# Patient Record
Sex: MALE | Race: WHITE | NOT HISPANIC OR LATINO | Employment: UNEMPLOYED | URBAN - METROPOLITAN AREA
[De-identification: names, ages, dates, MRNs, and addresses within clinical notes are randomized per-mention and may not be internally consistent; named-entity substitution may affect disease eponyms.]

---

## 2017-01-05 ENCOUNTER — HOSPITAL ENCOUNTER (EMERGENCY)
Facility: MEDICAL CENTER | Age: 37
End: 2017-01-06
Attending: EMERGENCY MEDICINE
Payer: MEDICAID

## 2017-01-05 VITALS
BODY MASS INDEX: 24.34 KG/M2 | DIASTOLIC BLOOD PRESSURE: 82 MMHG | HEIGHT: 70 IN | RESPIRATION RATE: 18 BRPM | WEIGHT: 170 LBS | TEMPERATURE: 98.4 F | HEART RATE: 82 BPM | SYSTOLIC BLOOD PRESSURE: 135 MMHG

## 2017-01-05 DIAGNOSIS — F32.9 REACTIVE DEPRESSION: ICD-10-CM

## 2017-01-05 DIAGNOSIS — F10.930 ALCOHOL WITHDRAWAL, UNCOMPLICATED (HCC): ICD-10-CM

## 2017-01-05 LAB
ALBUMIN SERPL BCP-MCNC: 3.3 G/DL (ref 3.2–4.9)
ALBUMIN/GLOB SERPL: 1.7 G/DL
ALP SERPL-CCNC: 43 U/L (ref 30–99)
ALT SERPL-CCNC: 16 U/L (ref 2–50)
AMPHET UR QL SCN: POSITIVE
ANION GAP SERPL CALC-SCNC: 6 MMOL/L (ref 0–11.9)
AST SERPL-CCNC: 28 U/L (ref 12–45)
BARBITURATES UR QL SCN: NEGATIVE
BENZODIAZ UR QL SCN: NEGATIVE
BILIRUB SERPL-MCNC: 1 MG/DL (ref 0.1–1.5)
BUN SERPL-MCNC: 7 MG/DL (ref 8–22)
BZE UR QL SCN: NEGATIVE
CALCIUM SERPL-MCNC: 8 MG/DL (ref 8.5–10.5)
CANNABINOIDS UR QL SCN: NEGATIVE
CHLORIDE SERPL-SCNC: 105 MMOL/L (ref 96–112)
CO2 SERPL-SCNC: 25 MMOL/L (ref 20–33)
CREAT SERPL-MCNC: 0.59 MG/DL (ref 0.5–1.4)
GFR SERPL CREATININE-BSD FRML MDRD: >60 ML/MIN/1.73 M 2
GLOBULIN SER CALC-MCNC: 2 G/DL (ref 1.9–3.5)
GLUCOSE SERPL-MCNC: 275 MG/DL (ref 65–99)
MDMA UR QL SCN: NEGATIVE
METHADONE UR QL SCN: NEGATIVE
OPIATES UR QL SCN: POSITIVE
OXYCODONE UR QL SCN: NEGATIVE
PCP UR QL SCN: NEGATIVE
POC BREATHALIZER: 0.01 PERCENT (ref 0–0.01)
POTASSIUM SERPL-SCNC: 4.2 MMOL/L (ref 3.6–5.5)
PROPOXYPH UR QL SCN: NEGATIVE
PROT SERPL-MCNC: 5.3 G/DL (ref 6–8.2)
SODIUM SERPL-SCNC: 136 MMOL/L (ref 135–145)

## 2017-01-05 PROCEDURE — 302970 POC BREATHALIZER

## 2017-01-05 PROCEDURE — 700101 HCHG RX REV CODE 250: Performed by: EMERGENCY MEDICINE

## 2017-01-05 PROCEDURE — 96375 TX/PRO/DX INJ NEW DRUG ADDON: CPT

## 2017-01-05 PROCEDURE — 99285 EMERGENCY DEPT VISIT HI MDM: CPT

## 2017-01-05 PROCEDURE — 80053 COMPREHEN METABOLIC PANEL: CPT

## 2017-01-05 PROCEDURE — 80307 DRUG TEST PRSMV CHEM ANLYZR: CPT

## 2017-01-05 PROCEDURE — 700111 HCHG RX REV CODE 636 W/ 250 OVERRIDE (IP): Performed by: EMERGENCY MEDICINE

## 2017-01-05 PROCEDURE — 96365 THER/PROPH/DIAG IV INF INIT: CPT

## 2017-01-05 RX ORDER — LORAZEPAM 2 MG/ML
1 INJECTION INTRAMUSCULAR ONCE
Status: COMPLETED | OUTPATIENT
Start: 2017-01-05 | End: 2017-01-05

## 2017-01-05 RX ADMIN — THIAMINE HYDROCHLORIDE 1000 ML: 100 INJECTION, SOLUTION INTRAMUSCULAR; INTRAVENOUS at 21:57

## 2017-01-05 RX ADMIN — LORAZEPAM 1 MG: 2 INJECTION INTRAMUSCULAR; INTRAVENOUS at 21:58

## 2017-01-05 ASSESSMENT — ENCOUNTER SYMPTOMS
SHORTNESS OF BREATH: 0
VOMITING: 0
NERVOUS/ANXIOUS: 1
NAUSEA: 0
DEPRESSION: 1

## 2017-01-05 ASSESSMENT — PAIN SCALES - GENERAL: PAINLEVEL_OUTOF10: 0

## 2017-01-05 NOTE — ED AVS SNAPSHOT
After Visit Summary                                                                                                                Matty Hernandez   MRN: [id number]    Department:  [facility], Emergency Dept   Date of Visit:  1/5/2017            [facility], Emergency Dept    [address] [address]    Phone:  [phone]      You were seen by     1. Denis Farrar M.D.    2. Monty Ch M.D.      Your Diagnosis Was     Reactive depression     F32.9       These are the medications you received during your hospitalization from 01/05/2017 2122 to 01/06/2017 0300     Date/Time Order Dose Route Action    01/05/2017 2157 er detox iv 1000 mL (D5LR + thiamine 100 mg + folic acid 1 mg + magnesium 1 g) infusion 1,000 mL Intravenous New Bag    01/05/2017 2158 lorazepam (ATIVAN) injection 1 mg 1 mg Intravenous Given      Follow-up Information     1. Follow up with Pcp Pt States None In 2 days.    Specialty:  Family Medicine        2. Follow up with [facility], Emergency Dept.    Specialty:  Emergency Medicine    Why:  As needed, If symptoms worsen    Contact information    [address] [place] [address] [phone]      Medication Information     Review all of your home medications and newly ordered medications with your primary doctor and/or pharmacist as soon as possible. Follow medication instructions as directed by your doctor and/or pharmacist.     Please keep your complete medication list with you and share with your physician. Update the information when medications are discontinued, doses are changed, or new medications (including over-the-counter products) are added; and carry medication information at all times in the event of emergency situations.               Medication List      Notice     You have not been prescribed any medications.            Procedures and tests performed during your visit     COMP METABOLIC PANEL    ED CONSULT  TO BEHAVIORAL HEALTH    ESTIMATED GFR    PO CHALLENGE    POC BREATHALIZER    URINE DRUG SCREEN        Discharge Instructions       Depression, Adult  Depression refers to feeling sad, low, down in the dumps, blue, gloomy, or empty. In general, there are two kinds of depression:  1. Normal sadness or normal grief. This kind of depression is one that we all feel from time to time after upsetting life experiences, such as the loss of a job or the ending of a relationship. This kind of depression is considered normal, is short lived, and resolves within a few days to 2 weeks. Depression experienced after the loss of a loved one (bereavement) often lasts longer than 2 weeks but normally gets better with time.  2. Clinical depression. This kind of depression lasts longer than normal sadness or normal grief or interferes with your ability to function at home, at work, and in school. It also interferes with your personal relationships. It affects almost every aspect of your life. Clinical depression is an illness.  Symptoms of depression can also be caused by conditions other than those mentioned above, such as:  · Physical illness. Some physical illnesses, including underactive thyroid gland (hypothyroidism), severe anemia, specific types of cancer, diabetes, uncontrolled seizures, heart and lung problems, strokes, and chronic pain are commonly associated with symptoms of depression.  · Side effects of some prescription medicine. In some people, certain types of medicine can cause symptoms of depression.  · Substance abuse. Abuse of alcohol and illicit drugs can cause symptoms of depression.  SYMPTOMS  Symptoms of normal sadness and normal grief include the following:  · Feeling sad or crying for short periods of time.  · Not caring about anything (apathy).  · Difficulty sleeping or sleeping too much.  · No longer able to enjoy the things you used to enjoy.  · Desire to be by oneself all the time (social  isolation).  · Lack of energy or motivation.  · Difficulty concentrating or remembering.  · Change in appetite or weight.  · Restlessness or agitation.  Symptoms of clinical depression include the same symptoms of normal sadness or normal grief and also the following symptoms:  · Feeling sad or crying all the time.  · Feelings of guilt or worthlessness.  · Feelings of hopelessness or helplessness.  · Thoughts of suicide or the desire to harm yourself (suicidal ideation).  · Loss of touch with reality (psychotic symptoms). Seeing or hearing things that are not real (hallucinations) or having false beliefs about your life or the people around you (delusions and paranoia).  DIAGNOSIS   The diagnosis of clinical depression is usually based on how bad the symptoms are and how long they have lasted. Your health care provider will also ask you questions about your medical history and substance use to find out if physical illness, use of prescription medicine, or substance abuse is causing your depression. Your health care provider may also order blood tests.  TREATMENT   Often, normal sadness and normal grief do not require treatment. However, sometimes antidepressant medicine is given for bereavement to ease the depressive symptoms until they resolve.  The treatment for clinical depression depends on how bad the symptoms are but often includes antidepressant medicine, counseling with a mental health professional, or both. Your health care provider will help to determine what treatment is best for you.  Depression caused by physical illness usually goes away with appropriate medical treatment of the illness. If prescription medicine is causing depression, talk with your health care provider about stopping the medicine, decreasing the dose, or changing to another medicine.  Depression caused by the abuse of alcohol or illicit drugs goes away when you stop using these substances. Some adults need professional help in order  to stop drinking or using drugs.  SEEK IMMEDIATE MEDICAL CARE IF:  · You have thoughts about hurting yourself or others.  · You lose touch with reality (have psychotic symptoms).  · You are taking medicine for depression and have a serious side effect.  FOR MORE INFORMATION  · National [place] on Mental Illness: www.evita.org   · National [place] of Mental Health: www.nimh.nih.gov      This information is not intended to replace advice given to you by your health care provider. Make sure you discuss any questions you have with your health care provider.     Document Released: 12/15/2001 Document Revised: 01/08/2016 Document Reviewed: 03/18/2013  [employer] Interactive Patient Education ©2016 [employer] Inc.            Patient Information     Patient Information    Following emergency treatment: all patient requiring follow-up care must return either to a private physician or a clinic if your condition worsens before you are able to obtain further medical attention, please return to the emergency room.     Billing Information    At [facility], we work to make the billing process streamlined for our patients.  Our Representatives are here to answer any questions you may have regarding your hospital bill.  If you have insurance coverage and have supplied your insurance information to us, we will submit a claim to your insurer on your behalf.  Should you have any questions regarding your bill, we can be reached online or by phone as follows:  Online: You are able pay your bills online or live chat with our representatives about any billing questions you may have. We are here to help Monday - Friday from 8:00am to 7:30pm and 9:00am - 12:00pm on Saturdays.  Please visit [email or web address]  for more information.   Phone:  [phone] or [phone]    Please note that your emergency physician, surgeon, pathologist, radiologist, anesthesiologist, and other specialists are not employed  by [facility] and will therefore bill separately for their services.  Please contact them directly for any questions concerning their bills at the numbers below:     Emergency Physician Services:  [phone]  [facility]:  [phone]  [facility]:  [phone]  [facility]:  [phone]    1. Your final bill may vary from the amount quoted upon discharge if all procedures are not complete at that time, or if your doctor has additional procedures of which we are not aware. You will receive an additional bill if you return to the Emergency Department at [facility] for suture removal regardless of the facility of which the sutures were placed.     2. Please arrange for settlement of this account at the emergency registration.    3. All self-pay accounts are due in full at the time of treatment.  If you are unable to meet this obligation then payment is expected within 4-5 days.     4. If you have had radiology studies (CT, X-ray, Ultrasound, MRI), you have received a preliminary result during your emergency department visit. Please contact the radiology department [phone] to receive a copy of your final result. Please discuss the Final result with your primary physician or with the follow up physician provided.     Crisis Hotline:  [place] Crisis Hotline:  2-743-GPMFMEE or 1-778.651.5567  [place] Crisis Hotline:    1-443.382.9003 or 302-006-6970         ED Discharge Follow Up Questions    1. In order to provide you with very good care, we would like to follow up with a phone call in the next few days.  May we have your permission to contact you?     YES /  NO    2. What is the best phone number to call you? (       )_____-__________    3. What is the best time to call you?      Morning  /  Afternoon  /  Evening                   Patient Signature:  ____________________________________________________________    Date:   ____________________________________________________________

## 2017-01-05 NOTE — ED AVS SNAPSHOT
BigRoad Access Code: [email or web address]  Expires: 2/5/2017  2:59 AM    Your email address is not on file at [facility].  Email Addresses are required for you to sign up for BigRoad, please contact [phone] to verify your personal information and to provide your email address prior to attempting to register for BigRoad.    Matty Hernandez  [address] [place] [address]    [employer]  A secure, online tool to manage your health information     [facility]’s BigRoad® is a secure, online tool that connects you to your personalized health information from the privacy of your home -- day or night - making it very easy for you to manage your healthcare. Once the activation process is completed, you can even access your medical information using the BigRoad kylee, which is available for free in the Apple Kylee store or Google Play store.     To learn more about BigRoad, visit [email or web address]    There are two levels of access available (as shown below):   My Chart Features  [facility] Primary Care Doctor [facility]  Specialists [facility]  Urgent  Care Non-[facility] Primary Care Doctor   Email your healthcare team securely and privately 24/7 X X X    Manage appointments: schedule your next appointment; view details of past/upcoming appointments X      Request prescription refills. X      View recent personal medical records, including lab and immunizations X X X X   View health record, including health history, allergies, medications X X X X   Read reports about your outpatient visits, procedures, consult and ER notes X X X X   See your discharge summary, which is a recap of your hospital and/or ER visit that includes your diagnosis, lab results, and care plan X X  X     How to register for [employer]:  Once your e-mail address has been verified, follow the following steps to sign up for [employer].     1. Go to  [email or web address]  2. Click on the Sign Up Now box, which takes you to the New Member Sign Up page. You will need to  provide the following information:  a. Enter your Rypos Access Code exactly as it appears at the top of this page. (You will not need to use this code after you’ve completed the sign-up process. If you do not sign up before the expiration date, you must request a new code.)   b. Enter your date of birth.   c. Enter your home email address.   d. Click Submit, and follow the next screen’s instructions.  3. Create a Rypos ID. This will be your Rypos login ID and cannot be changed, so think of one that is secure and easy to remember.  4. Create a Rypos password. You can change your password at any time.  5. Enter your Password Reset Question and Answer. This can be used at a later time if you forget your password.   6. Enter your e-mail address. This allows you to receive e-mail notifications when new information is available in Rypos.  7. Click Sign Up. You can now view your health information.    For assistance activating your Rypos account, call [phone]

## 2017-01-05 NOTE — ED AVS SNAPSHOT
1/6/2017          Matty Hernandez  [address]  [place] [address]    Dear Matty:    [facility] wants to ensure your discharge home is safe and you or your loved ones have had all your questions answered regarding your care after you leave the hospital.    You may receive a telephone call within two days of your discharge.  This call is to make certain you understand your discharge instructions as well as ensure we provided you with the best care possible during your stay with us.     The call will only last approximately 3-5 minutes and will be done by a nurse.    Once again, we want to ensure your discharge home is safe and that you have a clear understanding of any next steps in your care.  If you have any questions or concerns, please do not hesitate to contact us, we are here for you.  Thank you for choosing [facility] for your healthcare needs.    Sincerely,    Bruce Sanchez    [facility]

## 2017-01-06 PROCEDURE — 90791 PSYCH DIAGNOSTIC EVALUATION: CPT

## 2017-01-06 NOTE — ED NOTES
Pt placed on legal hold. All items removed from room, pt belongings labeled and in ambulance bay.

## 2017-01-06 NOTE — CONSULTS
[facility]   TRIAGE ASSESSMENT    Name: Matty Hernandez  MRN: [id number]  : 1980  Age: 36 y.o.  Date of assessment: 2017  PCP: Pcp Pt States None  Persons in attendance: Patient    CHIEF COMPLAINT/PRESENTING ISSUE (as stated by Matty Hernandez):   Chief Complaint   Patient presents with   • Suicidal Ideation        CURRENT LIVING SITUATION/SOCIAL SUPPORT: Shelter; states family is in [place] but supportive and caring    BEHAVIORAL HEALTH TREATMENT HISTORY  Does patient/parent report a history of prior behavioral health treatment for patient?   No:    SAFETY ASSESSMENT - SELF  Does patient acknowledge current or past symptoms of dangerousness to self? no  Does parent/significant other report patient has current or past symptoms of dangerousness to self? N\A  Does presenting problem suggest symptoms of dangerousness to self? No    SAFETY ASSESSMENT - OTHERS  Does patient acknowledge current or past symptoms of aggressive behavior or risk to others? no  Does parent/significant other report patient has current or past symptoms of aggressive behavior or risk to others?  N\A  Does presenting problem suggest symptoms of dangerousness to others? No    Crisis Safety Plan completed and copy given to patient? N\A    ABUSE/NEGLECT SCREENING  Does patient report feeling “unsafe” in his/her home, or afraid of anyone?  no  Does patient report any history of physical, sexual, or emotional abuse?  no  Does parent or significant other report any of the above? N\A  Is there evidence of neglect by self?  no  Is there evidence of neglect by a caregiver? no  Does the patient/parent report any history of CPS/APS/police involvement related to suspected abuse/neglect or domestic violence? no  Based on the information provided during the current assessment, is a mandated report of suspected abuse/neglect being made?  No    SUBSTANCE USE SCREENING  Yes:  Vasile all substances used in the past 30 days:      Last Use Amount   [x]    "Alcohol yesterday Pint/daily   []   Marijuana     []   Heroin     []   Prescription Opioids  (used without prescription, for    recreation, or in excess of prescribed amount)     []   Other Prescription  (used without prescription, for    recreation, or in excess of prescribed amount)     []   Cocaine      [x]   Methamphetamine yesterday $10 daily   []   \"\" drugs (ectasy, MDMA)     []   Other substances        UDS results: patient admits to alcohol and methamphetamines  Breathalyzer results: negativeWhat consequences does the patient associate with any of the above substance use and or addictive behaviors? Family problems: 3 children are with CPS, Monetary problems: can't afford    Risk factors for detox (check all that apply):  []  Seizures   [x]  Diaphoretic (sweating)   [x]  Tremors   []  Hallucinations   []  Increased blood pressure   []  Decreased blood pressure   [x]  Other anxiety   []  None      [] Patient education on risk factors for detoxification and instructed to return to ER as needed.      UDS results:   Breathalyzer results:     Risk factors for detox (check all that apply):  [] Seizures   [] Diaphoretic (sweating)   [] Tremors   [] Hallucinations   [] Increased blood pressure   [] Decreased blood pressure   [] Other    [] Patient education on risk factors for detoxification and instructed to return to ER as needed.  MENTAL STATUS   Participation: Active verbal participation  Grooming: Casual  Orientation: Alert and Fully Oriented  Behavior: Calm  Eye contact: Good  Mood: Depressed  Affect: Congruent with content and Sad  Thought process: Logical and Goal-directed  Thought content: Within normal limits  Speech: Rate within normal limits  Perception: Within normal limits  Memory:  No gross evidence of memory deficits  Insight: Adequate  Judgment:  Adequate  Other:    Collateral information:   Source:  [] Significant other present in person:   [] Significant other by telephone  [] [facility] Social " Worker  [x] [facility] Nursing Staff  [] [facility]  [] Other:     [] Unable to complete full assessment due to:  [] Acute intoxication  [] Patient declined to participate/engage  [] Patient verbally unresponsive  [] Significant cognitive deficits  [] Significant perceptual distortions or behavioral disorganization  [] Other:      CLINICAL IMPRESSIONS:  Primary  R/O: Amphetamine indiuced depressive d/o F15.94   Secondary:  R/O Alcohol use disorder severe F10.20       IDENTIFIED NEEDS/PLAN:  [Trigger DISPOSITION list for any items marked]    []  Imminent safety risk - self [] Imminent safety risk - others   [x]  Acute substance withdrawl []  Psychosis/Impaired reality testing   []  Mood/anxiety [x]  Substance use/Addictive behavior   [x]  Maladaptive behaviro []  Parent/child conflict   []  Family/Couples conflict []  Biomedical   [x]  Housing [x]  Financial   []   Legal  Occupational/Educational   []  Domestic violence []  Other:     Disposition: Refer to [facility]    Does patient express agreement with the above plan? yes    Referral appointment(s) scheduled? N\A    Alert team only: 36 year old male initially reported that he was suicidal.  During the assessment he reported that he wanted to detox and get treatment for his alcohol and drug problem.  He had been to [facility] and wanted to be admitted again for detox.  He stated he would talk to [facility] about IOP after detox.  [facility] was contacted and they stated that they did have an available bed and to send him over.   proved a taxi voucher.  No current suicidal ideation; positive future orientation  I have discussed findings and recommendations with Dr. Ch who is in agreement with these recommendations.     Referral documentation sent to the following facilities:  [facility]    Melany Pelayo R.N.  1/6/2017

## 2017-01-06 NOTE — DISCHARGE INSTRUCTIONS
Depression, Adult  Depression refers to feeling sad, low, down in the dumps, blue, gloomy, or empty. In general, there are two kinds of depression:  1. Normal sadness or normal grief. This kind of depression is one that we all feel from time to time after upsetting life experiences, such as the loss of a job or the ending of a relationship. This kind of depression is considered normal, is short lived, and resolves within a few days to 2 weeks. Depression experienced after the loss of a loved one (bereavement) often lasts longer than 2 weeks but normally gets better with time.  2. Clinical depression. This kind of depression lasts longer than normal sadness or normal grief or interferes with your ability to function at home, at work, and in school. It also interferes with your personal relationships. It affects almost every aspect of your life. Clinical depression is an illness.  Symptoms of depression can also be caused by conditions other than those mentioned above, such as:  · Physical illness. Some physical illnesses, including underactive thyroid gland (hypothyroidism), severe anemia, specific types of cancer, diabetes, uncontrolled seizures, heart and lung problems, strokes, and chronic pain are commonly associated with symptoms of depression.  · Side effects of some prescription medicine. In some people, certain types of medicine can cause symptoms of depression.  · Substance abuse. Abuse of alcohol and illicit drugs can cause symptoms of depression.  SYMPTOMS  Symptoms of normal sadness and normal grief include the following:  · Feeling sad or crying for short periods of time.  · Not caring about anything (apathy).  · Difficulty sleeping or sleeping too much.  · No longer able to enjoy the things you used to enjoy.  · Desire to be by oneself all the time (social isolation).  · Lack of energy or motivation.  · Difficulty concentrating or remembering.  · Change in appetite or weight.  · Restlessness or  agitation.  Symptoms of clinical depression include the same symptoms of normal sadness or normal grief and also the following symptoms:  · Feeling sad or crying all the time.  · Feelings of guilt or worthlessness.  · Feelings of hopelessness or helplessness.  · Thoughts of suicide or the desire to harm yourself (suicidal ideation).  · Loss of touch with reality (psychotic symptoms). Seeing or hearing things that are not real (hallucinations) or having false beliefs about your life or the people around you (delusions and paranoia).  DIAGNOSIS   The diagnosis of clinical depression is usually based on how bad the symptoms are and how long they have lasted. Your health care provider will also ask you questions about your medical history and substance use to find out if physical illness, use of prescription medicine, or substance abuse is causing your depression. Your health care provider may also order blood tests.  TREATMENT   Often, normal sadness and normal grief do not require treatment. However, sometimes antidepressant medicine is given for bereavement to ease the depressive symptoms until they resolve.  The treatment for clinical depression depends on how bad the symptoms are but often includes antidepressant medicine, counseling with a mental health professional, or both. Your health care provider will help to determine what treatment is best for you.  Depression caused by physical illness usually goes away with appropriate medical treatment of the illness. If prescription medicine is causing depression, talk with your health care provider about stopping the medicine, decreasing the dose, or changing to another medicine.  Depression caused by the abuse of alcohol or illicit drugs goes away when you stop using these substances. Some adults need professional help in order to stop drinking or using drugs.  SEEK IMMEDIATE MEDICAL CARE IF:  · You have thoughts about hurting yourself or others.  · You lose touch  with reality (have psychotic symptoms).  · You are taking medicine for depression and have a serious side effect.  FOR MORE INFORMATION  · National [place] on Mental Illness: www.evita.org   · National [place] of Mental Health: www.nimh.nih.gov      This information is not intended to replace advice given to you by your health care provider. Make sure you discuss any questions you have with your health care provider.     Document Released: 12/15/2001 Document Revised: 01/08/2016 Document Reviewed: 03/18/2013  Elsevier Interactive Patient Education ©2016 [employer].

## 2017-01-06 NOTE — ED NOTES
Pt to ER per shante. PT reports SI.  Hx of SI x4 years ago. Pt went to [facility] for detox today. Pt states smoked meth at noon, and half 5th of whiskey earlier today.

## 2017-01-06 NOTE — ED NOTES
Pt sleeping on cart showing no signs of distress. Pt remains NSR on monitor, HR at 95. Resp even unlabored, skin pink warm dry.

## 2017-01-06 NOTE — ED NOTES
Pt remains on cart sleeping. NSR on monitor No signs of distress. Resp even unlabored, skin pink warm dry

## 2017-01-06 NOTE — ED PROVIDER NOTES
"ED Provider Note    Scribed for Denis Farrar M.D. by Christine Felton. 1/5/2017, 9:34 PM.    Primary care provider: Pcp Pt States None  Means of arrival: EMS  History obtained from: Patient  History limited by: None     CHIEF COMPLAINT  Chief Complaint   Patient presents with   • Suicidal Ideation       HAKEEM Hernandez is a 36 y.o. male who presents to the Emergency Department by ambulance for suicidal ideation. He states, \"My life is a wreck.\". Patient states his last drink was 4-5 hours ago and he drinks roughly a pint per day. He also uses methamphetamine an average of four times a week and last used today. Patient states he has been on and off drugs since his twenties and he is \"ready to quit\". He report a recent period of sobriety for three months when he was away from [place], but when he returned to [place] he began using again. He currently has no medical complaints and denies chest pain, shortness of breath, nausea, vomiting and pain. He states he currently feels anxious and depressed. He denies history of medical problems including diabetes and hypertension. He is a cigarette smoker.      REVIEW OF SYSTEMS  Review of Systems   Respiratory: Negative for shortness of breath.    Cardiovascular: Negative for chest pain.   Gastrointestinal: Negative for nausea and vomiting.   Psychiatric/Behavioral: Positive for depression and suicidal ideas. The patient is nervous/anxious.      PAST MEDICAL HISTORY   No chronic medical problems reported.     SURGICAL HISTORY  patient denies any surgical history    SOCIAL HISTORY  Social History   Substance Use Topics   • Smoking status: Current Some Day Smoker   • Alcohol Use: Yes      Comment: occ      History   Drug Use   • Yes   • Special: Intravenous, Inhaled     Comment: crystal meth, last 6/1/16     FAMILY HISTORY  None noted during this encounter.     CURRENT MEDICATIONS  Home Medications     Reviewed by Monty Montejo R.N. (Registered Nurse) on 01/05/17 at 2127  Med " "List Status: Not Addressed    Medication Last Dose Status          Patient Rubin Taking any Medications                      ALLERGIES  No Known Allergies    PHYSICAL EXAM  VITAL SIGNS: /77 mmHg  Pulse 108  Temp(Src) 36.9 °C (98.4 °F)  Resp 18  Ht 1.778 m (5' 10\")  Wt 77.111 kg (170 lb)  BMI 24.39 kg/m2    Constitutional:  No acute distress  HENT: Moist mucous membranes  Eyes: No conjunctivitis or icterus  Neck: trachea is midline, no palpable thyroid  Lymphatic: No cervical lymphadenopathy  Cardiovascular: Tachycardic. Regularrhythm, no murmurs  Thorax & Lungs: Normal breath sounds, no rhonchi  Abdomen: Soft, Non-tender  Skin:. No rash  Back: Non-tender, no CVA tenderness  Extremities:  no edema  Vascular: symmetric radial pulse  Neurologic: Normal gross motor  Psychiatric: Normal affect.    LABS  Labs Reviewed   COMP METABOLIC PANEL - Abnormal; Notable for the following:     Glucose 275 (*)     Bun 7 (*)     Calcium 8.0 (*)     Total Protein 5.3 (*)     All other components within normal limits   URINE DRUG SCREEN - Abnormal; Notable for the following:     Amphetamines Urine Positive (*)     Opiates Positive (*)     All other components within normal limits   POC BREATHALIZER - Normal   ESTIMATED GFR     All labs reviewed by me.    COURSE & MEDICAL DECISION MAKING  Pertinent Labs & Imaging studies reviewed. (See chart for details)    9:34 PM - Patient seen and examined at bedside. Patient will be treated with ER Detox IV 1000 mL with D5Lr + thiamine 100 mg + folic acid 1 mg + magnesium 1g and 1 mg ativan. Ordered urine drug screen, CMP and POC breathalyzer to evaluate his symptoms.  [facility] will see the patient.       DISPOSITION:  Patient's care will be transferred to my partner as he awaits transfer to a psychiatric facility.    FINAL IMPRESSION  1. Reactive depression    2. Alcohol withdrawal, uncomplicated (HCC)          Christine RAMSYE (Scribe), am scribing for, and in the presence of, Denis" NOBLE Farrar M.D..    Electronically signed by: Christine Felton (Scribe), 1/5/2017    I, Denis Farrar M.D. personally performed the services described in this documentation, as scribed by Christine Felton in my presence, and it is both accurate and complete.    The note accurately reflects work and decisions made by me.  Denis Farrar  1/6/2017  5:35 PM

## 2017-01-06 NOTE — ED NOTES
D/c instructions and cab voucher given to pt. [employer] called, eta 15 mins. Pt verbalizes he understands d/c instructions and denies further questions, needs, or concerns. Encouraged follow up and to return to ER if symptoms return or worsen. Pt resp even unlabored, skin pink warm dry, alert and oriented x3, ambulates with steady gait.

## 2017-03-13 ENCOUNTER — HOSPITAL ENCOUNTER (EMERGENCY)
Facility: MEDICAL CENTER | Age: 37
End: 2017-03-13
Attending: EMERGENCY MEDICINE
Payer: MEDICAID

## 2017-03-13 VITALS
TEMPERATURE: 97.5 F | WEIGHT: 175.04 LBS | RESPIRATION RATE: 15 BRPM | DIASTOLIC BLOOD PRESSURE: 106 MMHG | HEIGHT: 70 IN | HEART RATE: 99 BPM | OXYGEN SATURATION: 96 % | SYSTOLIC BLOOD PRESSURE: 149 MMHG | BODY MASS INDEX: 25.06 KG/M2

## 2017-03-13 DIAGNOSIS — F15.10 METHAMPHETAMINE ABUSE (HCC): ICD-10-CM

## 2017-03-13 DIAGNOSIS — F19.10 POLYSUBSTANCE ABUSE (HCC): ICD-10-CM

## 2017-03-13 DIAGNOSIS — F10.10 ALCOHOL ABUSE: ICD-10-CM

## 2017-03-13 PROCEDURE — 99284 EMERGENCY DEPT VISIT MOD MDM: CPT

## 2017-03-13 PROCEDURE — 90791 PSYCH DIAGNOSTIC EVALUATION: CPT

## 2017-03-13 ASSESSMENT — PAIN SCALES - GENERAL: PAINLEVEL_OUTOF10: 0
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
## 2017-03-13 NOTE — ED NOTES
Jassi from [employer] has seen patient. He notified [facility] and they will come to interview him.

## 2017-03-13 NOTE — ED PROVIDER NOTES
"ED Provider Note    Scribed for Caleb Paul M.D. by Parviz Yao. 3/13/2017  7:35 AM    Primary care provider: Pcp Pt States None  Means of arrival: walk in  History obtained from: patient  History limited by: none    CHIEF COMPLAINT  Chief Complaint   Patient presents with   • Psych Eval     paranoid   • Drug Ingestion   • Alcohol Intoxication   • Detox     was 2 months sober - needs assistance for detox       HPI  Matty Hernandez is a 37 y.o. male who presents to the Emergency Department for psych evaluation. Patient reports that he has paranoia starting today secondary to chronic methamphetamine use. He denies any medical complaints at this time.     REVIEW OF SYSTEMS  Pertinent positives include psych evaluation, paranoia.   E.    PAST MEDICAL HISTORY   None noted for this encounter    SURGICAL HISTORY  patient denies any surgical history    SOCIAL HISTORY  Social History   Substance Use Topics   • Smoking status: Current Some Day Smoker   • Smokeless tobacco: None noted   • Alcohol Use: Yes      Comment: occ      History   Drug Use   • Yes   • Special: Intravenous, Inhaled     Comment: crystal meth, last 6/1/16       FAMILY HISTORY  None noted    CURRENT MEDICATIONS  No current facility-administered medications for this encounter.  No current outpatient prescriptions on file.    ALLERGIES  No Known Allergies    PHYSICAL EXAM  VITAL SIGNS: /106 mmHg  Pulse 99  Temp(Src) 36.4 °C (97.5 °F)  Resp 15  Ht 1.778 m (5' 10\")  Wt 79.4 kg (175 lb 0.7 oz)  BMI 25.12 kg/m2  SpO2 96%    Nursing note and vitals reviewed.  Constitutional: Well-developed and well-nourished. No distress.   HENT: Head is normocephalic and atraumatic. Oropharynx is clear and moist without exudate or erythema.   Eyes: Pupils are equal, round, and reactive to light and dilated. Conjunctiva are normal.   Cardiovascular: Normal rate and regular rhythm. No murmur heard. Normal radial pulses.  Pulmonary/Chest: Breath sounds normal. No " wheezes or rales.   Abdominal: Soft and non-tender. No distention    Musculoskeletal: Extremities exhibit normal range of motion without edema or tenderness.   Neurological: Awake, alert and oriented to person, place, and time. No focal deficits noted.  Skin: Skin is warm and dry. No rash.   Psychiatric: Normal mood and affect. Appropriate for clinical situation. Anxious. States he feels paranoid. Denies suicidal/homicidal ideation.     DIAGNOSTIC STUDIES / PROCEDURES    COURSE & MEDICAL DECISION MAKING  Nursing notes, VS, PMSFHx reviewed in chart.     7:35 AM - Patient seen and examined at bedside. The differential diagnoses include but are not limited to: methamphetamine use, polysubstance abuse. I informed the patient that I will have life skills consult and arrange for transport to [facility].     8:41 AM - Life skills informs that the patient is accepted at [facility]. I will discharge to their care.       The patient will return for new or worsening symptoms and is stable at the time of discharge.    The patient is referred to a primary physician for blood pressure management, diabetic screening, and for all other preventative health concerns.    DISPOSITION:  Patient will be discharged home in stable condition.    FOLLOW UP:  [facility], Emergency Dept  [address] [address] [phone]    If symptoms worsen      OUTPATIENT MEDICATIONS:  There are no discharge medications for this patient.          FINAL IMPRESSION  1. Polysubstance abuse    2. Methamphetamine abuse    3. Alcohol abuse          Parviz RAMSEY ([place]), am scribing for, and in the presence of, Caleb Paul M.D..    Electronically signed by: Parviz Yao (Erendiraibamy), 3/13/2017    Caleb RAMSEY M.D. personally performed the services described in this documentation, as scribed by Parviz Yao in my presence, and it is both accurate and complete.    The note accurately reflects work and  decisions made by me.  Caleb Paul  3/13/2017  11:54 AM

## 2017-03-13 NOTE — ED NOTES
Patient to ED triage via EMS for paranoid behavior after ingesting meth and alcohol. He states tonight he used meth and drank 1/2 pint ETOH. Since that time he has been seeing things he does not think are real, he feels like people are trying to kill him. He states he does not feel safe. He denies HI/SI. He does have Hx of abuse. States he had 2 months of sobriety prior to this event. He is asking for assistance or referral to drug rehab.     Pt educated on ED process and asked to wait in lobby until appropriate bed assignment can be made. Patient educated on importance of alerting staff to new or worsening symptoms or concerns.

## 2017-03-13 NOTE — DISCHARGE INSTRUCTIONS
"Polysubstance Abuse  When people abuse more than one drug or type of drug it is called polysubstance or polydrug abuse. For example, many smokers also drink alcohol. This is one form of polydrug abuse. Polydrug abuse also refers to the use of a drug to counteract an unpleasant effect produced by another drug. It may also be used to help with withdrawal from another drug. People who take stimulants may become agitated. Sometimes this agitation is countered with a tranquilizer. This helps protect against the unpleasant side effects. Polydrug abuse also refers to the use of different drugs at the same time.   Anytime drug use is interfering with normal living activities, it has become abuse. This includes problems with family and friends. Psychological dependence has developed when your mind tells you that the drug is needed. This is usually followed by physical dependence which has developed when continuing increases of drug are required to get the same feeling or \"high\". This is known as addiction or chemical dependency. A person's risk is much higher if there is a history of chemical dependency in the family.  SIGNS OF CHEMICAL DEPENDENCY  · You have been told by friends or family that drugs have become a problem.  · You fight when using drugs.  · You are having blackouts (not remembering what you do while using).  · You feel sick from using drugs but continue using.  · You lie about use or amounts of drugs (chemicals) used.  · You need chemicals to get you going.  · You are suffering in work performance or in school because of drug use.  · You get sick from use of drugs but continue to use anyway.  · You need drugs to relate to people or feel comfortable in social situations.  · You use drugs to forget problems.  \"Yes\" answered to any of the above signs of chemical dependency indicates there are problems. The longer the use of drugs continues, the greater the problems will become.  If there is a family history of " drug or alcohol use, it is best not to experiment with these drugs. Continual use leads to tolerance. After tolerance develops more of the drug is needed to get the same feeling. This is followed by addiction. With addiction, drugs become the most important part of life. It becomes more important to take drugs than participate in the other usual activities of life. This includes relating to friends and family. Addiction is followed by dependency. Dependency is a condition where drugs are now needed not just to get high, but to feel normal.  Addiction cannot be cured but it can be stopped. This often requires outside help and the care of professionals. Treatment centers are listed in the yellow pages under: Cocaine, Narcotics, and Alcoholics Anonymous. Most hospitals and clinics can refer you to a specialized care center. Talk to your caregiver if you need help.     This information is not intended to replace advice given to you by your health care provider. Make sure you discuss any questions you have with your health care provider.     Document Released: 08/09/2006 Document Revised: 03/11/2013 Document Reviewed: 12/23/2015  [employer] Interactive Patient Education ©2016 [employer]

## 2017-03-20 ENCOUNTER — HOSPITAL ENCOUNTER (EMERGENCY)
Facility: MEDICAL CENTER | Age: 37
End: 2017-03-20
Attending: EMERGENCY MEDICINE
Payer: MEDICAID

## 2017-03-20 VITALS
HEART RATE: 98 BPM | HEIGHT: 70 IN | SYSTOLIC BLOOD PRESSURE: 130 MMHG | TEMPERATURE: 97.9 F | DIASTOLIC BLOOD PRESSURE: 70 MMHG | BODY MASS INDEX: 23.34 KG/M2 | WEIGHT: 163 LBS | OXYGEN SATURATION: 96 % | RESPIRATION RATE: 18 BRPM

## 2017-03-20 DIAGNOSIS — K02.9 DENTAL CARIES: ICD-10-CM

## 2017-03-20 DIAGNOSIS — K08.89 DENTALGIA: ICD-10-CM

## 2017-03-20 DIAGNOSIS — K04.7 DENTAL INFECTION: ICD-10-CM

## 2017-03-20 PROCEDURE — 99283 EMERGENCY DEPT VISIT LOW MDM: CPT

## 2017-03-20 RX ORDER — IBUPROFEN 800 MG/1
800 TABLET ORAL EVERY 8 HOURS PRN
Qty: 30 TAB | Refills: 0 | Status: SHIPPED | OUTPATIENT
Start: 2017-03-20 | End: 2017-11-15

## 2017-03-20 RX ORDER — AMOXICILLIN 500 MG/1
500 CAPSULE ORAL 3 TIMES DAILY
Qty: 21 CAP | Refills: 0 | Status: SHIPPED | OUTPATIENT
Start: 2017-03-20 | End: 2017-03-27

## 2017-03-20 ASSESSMENT — PAIN SCALES - GENERAL
PAINLEVEL_OUTOF10: 7
PAINLEVEL_OUTOF10: 7

## 2017-03-20 NOTE — ED PROVIDER NOTES
"ED Provider Note    Scribed for Sarah Mart M.D. by Alejandra Garcia. 3/20/2017, 1:26 PM.    Primary care provider: Pcp Pt States None  Means of arrival: Walk-in  History obtained from: Patient  History limited by: None    CHIEF COMPLAINT  Chief Complaint   Patient presents with   • Dental Pain     right lower       HPI  Matty Hernandez is a 37 y.o. male who presents to the Emergency Department with 1 week of right lower jaw pain and swelling. He admits that he has very poor dental hygiene and most of his teeth are missing. He is hoping to go to the local [facility] dental clinic next week. He has been taking Tylenol for his pain without significant relief. He denies any recent fevers, vomiting. He just recently got off drugs.    REVIEW OF SYSTEMS  Pertinent positives include dental pain. Pertinent negatives include no fevers, vomiting.  See HPI for further details.     PAST MEDICAL HISTORY    none    SURGICAL HISTORY  History reviewed. No pertinent past surgical history.    SOCIAL HISTORY  Social History   Substance Use Topics   • Smoking status: Current Some Day Smoker   • Smokeless tobacco: None   • Alcohol Use: Yes      Comment: occ      History   Drug Use   • Yes   • Special: Intravenous, Inhaled     Comment: crystal meth, last 6/1/16       FAMILY HISTORY  History reviewed. No pertinent family history.    CURRENT MEDICATIONS  Home Medications     Reviewed by Osito Warner R.N. (Registered Nurse) on 03/20/17 at 1303  Med List Status: Complete    Medication Last Dose Status          Patient Rubin Taking any Medications                        ALLERGIES  No Known Allergies    PHYSICAL EXAM  VITAL SIGNS: /75 mmHg  Pulse 102  Temp(Src) 36.5 °C (97.7 °F)  Resp 16  Ht 1.778 m (5' 10\")  Wt 73.936 kg (163 lb)  BMI 23.39 kg/m2  SpO2 96%  Vitals reviewed.    Consitutional: Well-developed, thin, disheveled. Negative for: distress.  HENT: Normocephalic, atraumatic, right external ear normal, left external ear " normal, oropharynx clear and moist. Dentition is significantly poor with multiple missing teeth. Patient has some swelling and tenderness where teeth #28 through 32 should be. No obvious discrete abscess that could be drained. Submandibular area soft and supple. Tongue has full excursion. No trismus.  Eyes: Conjunctivae normal, negative for left and right eye discharge.  Neck: Range of motion normal, supple. No submandibular or cervical lymphadenopathy  Musculoskeletal: Normal range of motion.  Neurological: Alert and oriented x3.  Skin: Warm, dry. Negative for: erythema, rash.  Psych: Mood/affect normal, behavior normal.      COURSE & MEDICAL DECISION MAKING  Nursing notes, VS, PMSFHx reviewed in chart.      1:26 PM - Patient seen and examined at bedside. No obvious abscess, Milton angina.     The patient will return for new or worsening symptoms and is stable at the time of discharge.        DISPOSITION:  Patient will be discharged home in stable condition.    FOLLOW UP:  [facility] dental clinic            OUTPATIENT MEDICATIONS:  New Prescriptions    AMOXICILLIN (AMOXIL) 500 MG CAP    Take 1 Cap by mouth 3 times a day for 7 days.    IBUPROFEN (MOTRIN) 800 MG TAB    Take 1 Tab by mouth every 8 hours as needed for Moderate Pain.           FINAL IMPRESSION  1. Dentalgia    2. Dental infection    3. Dental caries          Alejandra RAMSEY (Erendiraibe), am scribing for, and in the presence of, Sarah Mart M.D..    Electronically signed by: Alejandra Garcia (Scribe), 3/20/2017    Sarah RAMSEY M.D. personally performed the services described in this documentation, as scribed by Alejandra Garcia in my presence, and it is both accurate and complete.    The note accurately reflects work and decisions made by me.  Sarah Mart  3/20/2017  1:38 PM

## 2017-03-20 NOTE — DISCHARGE INSTRUCTIONS
Dental Pain  Dental pain may be caused by many things, including:  · Tooth decay (cavities or caries). Cavities cause the nerve of your tooth to be open to air and hot or cold temperatures. This can cause pain or discomfort.  · Abscess or infection. A dental abscess is an area that is full of infected pus from a bacterial infection in the inner part of the tooth (pulp). It usually happens at the end of the tooth's root.  · Injury.  · An unknown reason (idiopathic).  Your pain may be mild or severe. It may only happen when:  · You are chewing.  · You are exposed to hot or cold temperature.  · You are eating or drinking sugary foods or beverages, such as:  ¨ Soda.  ¨ Candy.  Your pain may also be there all of the time.  HOME CARE  Watch your dental pain for any changes. Do these things to lessen your discomfort:  · Take medicines only as told by your dentist.  · If your dentist tells you to take an antibiotic medicine, finish all of it even if you start to feel better.  · Keep all follow-up visits as told by your dentist. This is important.  · Do not apply heat to the outside of your face.  · Rinse your mouth or gargle with salt water if told by your dentist. This helps with pain and swelling.  ¨ You can make salt water by adding ¼ tsp of salt to 1 cup of warm water.  · Apply ice to the painful area of your face:  ¨ Put ice in a plastic bag.  ¨ Place a towel between your skin and the bag.  ¨ Leave the ice on for 20 minutes, 2-3 times per day.  · Avoid foods or drinks that cause you pain, such as:  ¨ Very hot or very cold foods or drinks.  ¨ Sweet or sugary foods or drinks.  GET HELP IF:  · Your pain is not helped with medicines.  · Your symptoms are worse.  · You have new symptoms.  GET HELP RIGHT AWAY IF:  · You cannot open your mouth.  · You are having trouble breathing or swallowing.  · You have a fever.  · Your face, neck, or jaw is puffy (swollen).     This information is not intended to replace advice given to  you by your health care provider. Make sure you discuss any questions you have with your health care provider.     Document Released: 06/05/2009 Document Revised: 05/03/2016 Document Reviewed: 12/14/2015  Elsevier Interactive Patient Education ©2016 Elsevier Inc.

## 2017-03-20 NOTE — ED AVS SNAPSHOT
Home Care Instructions                                                                                                                Matty Hernandez   MRN: [id number]    Department:  [facility], Emergency Dept   Date of Visit:  3/20/2017            [facility], Emergency Dept    [address]    [place] [address]    Phone:  [phone]      You were seen by     Sarah Mart M.D.      Your Diagnosis Was     Dentalgia     K08.89       Follow-up Information     1. Follow up with mobile [facility] dental clinic.      Medication Information     Review all of your home medications and newly ordered medications with your primary doctor and/or pharmacist as soon as possible. Follow medication instructions as directed by your doctor and/or pharmacist.     Please keep your complete medication list with you and share with your physician. Update the information when medications are discontinued, doses are changed, or new medications (including over-the-counter products) are added; and carry medication information at all times in the event of emergency situations.               Medication List      START taking these medications        Instructions    Morning Afternoon Evening Bedtime    amoxicillin 500 MG Caps   Commonly known as:  AMOXIL        Take 1 Cap by mouth 3 times a day for 7 days.   Dose:  500 mg                        ibuprofen 800 MG Tabs   Commonly known as:  MOTRIN        Take 1 Tab by mouth every 8 hours as needed for Moderate Pain.   Dose:  800 mg                             Where to Get Your Medications      These medications were sent to [id number] - [place] - [address]  [address], [place] [address]     Phone:  [phone]    - amoxicillin 500 MG Caps      You can get these medications from any pharmacy     Bring a paper prescription for each of these medications    - ibuprofen 800 MG Tabs              Discharge Instructions       Dental  Pain  Dental pain may be caused by many things, including:  · Tooth decay (cavities or caries). Cavities cause the nerve of your tooth to be open to air and hot or cold temperatures. This can cause pain or discomfort.  · Abscess or infection. A dental abscess is an area that is full of infected pus from a bacterial infection in the inner part of the tooth (pulp). It usually happens at the end of the tooth's root.  · Injury.  · An unknown reason (idiopathic).  Your pain may be mild or severe. It may only happen when:  · You are chewing.  · You are exposed to hot or cold temperature.  · You are eating or drinking sugary foods or beverages, such as:  ¨ Soda.  ¨ Candy.  Your pain may also be there all of the time.  HOME CARE  Watch your dental pain for any changes. Do these things to lessen your discomfort:  · Take medicines only as told by your dentist.  · If your dentist tells you to take an antibiotic medicine, finish all of it even if you start to feel better.  · Keep all follow-up visits as told by your dentist. This is important.  · Do not apply heat to the outside of your face.  · Rinse your mouth or gargle with salt water if told by your dentist. This helps with pain and swelling.  ¨ You can make salt water by adding ¼ tsp of salt to 1 cup of warm water.  · Apply ice to the painful area of your face:  ¨ Put ice in a plastic bag.  ¨ Place a towel between your skin and the bag.  ¨ Leave the ice on for 20 minutes, 2-3 times per day.  · Avoid foods or drinks that cause you pain, such as:  ¨ Very hot or very cold foods or drinks.  ¨ Sweet or sugary foods or drinks.  GET HELP IF:  · Your pain is not helped with medicines.  · Your symptoms are worse.  · You have new symptoms.  GET HELP RIGHT AWAY IF:  · You cannot open your mouth.  · You are having trouble breathing or swallowing.  · You have a fever.  · Your face, neck, or jaw is puffy (swollen).     This information is not intended to replace advice given to you by  your health care provider. Make sure you discuss any questions you have with your health care provider.     Document Released: 06/05/2009 Document Revised: 05/03/2016 Document Reviewed: 12/14/2015  Elsevier Interactive Patient Education ©2016 Elsevier Inc.            Patient Information     Patient Information    Following emergency treatment: all patient requiring follow-up care must return either to a private physician or a clinic if your condition worsens before you are able to obtain further medical attention, please return to the emergency room.     Billing Information    At [facility], we work to make the billing process streamlined for our patients.  Our Representatives are here to answer any questions you may have regarding your hospital bill.  If you have insurance coverage and have supplied your insurance information to us, we will submit a claim to your insurer on your behalf.  Should you have any questions regarding your bill, we can be reached online or by phone as follows:  Online: You are able pay your bills online or live chat with our representatives about any billing questions you may have. We are here to help Monday - Friday from 8:00am to 7:30pm and 9:00am - 12:00pm on Saturdays.  Please visit [email or web address]  for more information.   Phone:  [phone] or [phone]    Please note that your emergency physician, surgeon, pathologist, radiologist, anesthesiologist, and other specialists are not employed by [facility] and will therefore bill separately for their services.  Please contact them directly for any questions concerning their bills at the numbers below:     Emergency Physician Services:  [phone]  [facility]:  [phone]  [facility]:  [phone]  [facility]:  [phone]    1. Your final bill may vary from the amount quoted upon discharge if all procedures are not complete at that time,  or if your doctor has additional procedures of which we are not aware. You will receive an additional bill if you return to the Emergency Department at [facility] for suture removal regardless of the facility of which the sutures were placed.     2. Please arrange for settlement of this account at the emergency registration.    3. All self-pay accounts are due in full at the time of treatment.  If you are unable to meet this obligation then payment is expected within 4-5 days.     4. If you have had radiology studies (CT, X-ray, Ultrasound, MRI), you have received a preliminary result during your emergency department visit. Please contact the radiology department [phone] to receive a copy of your final result. Please discuss the Final result with your primary physician or with the follow up physician provided.     Crisis Hotline:  [place] Crisis Hotline:  5-920-APCFWYP or 1-397.993.2615  [place] Crisis Hotline:    1-263.467.4654 or 471-438-4837         ED Discharge Follow Up Questions    1. In order to provide you with very good care, we would like to follow up with a phone call in the next few days.  May we have your permission to contact you?     YES /  NO    2. What is the best phone number to call you? (       )_____-__________    3. What is the best time to call you?      Morning  /  Afternoon  /  Evening                   Patient Signature:  ____________________________________________________________    Date:  ____________________________________________________________

## 2017-03-20 NOTE — ED NOTES
"Chief Complaint   Patient presents with   • Dental Pain     right lower     Pt reports increased dental pain right lower mouth. Black gums and broken teeth. Blood pressure 126/75, pulse 102, temperature 36.5 °C (97.7 °F), resp. rate 16, height 1.778 m (5' 10\"), weight 73.936 kg (163 lb), SpO2 96 %.    "

## 2017-03-20 NOTE — ED AVS SNAPSHOT
3/20/2017          Matty Hernandez  [address]  [place] [address]    Dear Matty:    [facility] wants to ensure your discharge home is safe and you or your loved ones have had all your questions answered regarding your care after you leave the hospital.    You may receive a telephone call within two days of your discharge.  This call is to make certain you understand your discharge instructions as well as ensure we provided you with the best care possible during your stay with us.     The call will only last approximately 3-5 minutes and will be done by a nurse.    Once again, we want to ensure your discharge home is safe and that you have a clear understanding of any next steps in your care.  If you have any questions or concerns, please do not hesitate to contact us, we are here for you.  Thank you for choosing [facility] for your healthcare needs.    Sincerely,    Bruce Sanchez    [facility]

## 2017-03-20 NOTE — ED NOTES
Pt c/o R lower dental pain x 1 week. Pt has poor dental hygiene, gums black and there are missing/broken teeth. Pt states he does not have dentist. Pt states he is hoping to go to [facility] clinic on [facility]rd for this pain. Pt is currently in drug treatment program, does not want narcotics. Pt a/o x4, speaking in full sentences.

## 2017-03-20 NOTE — ED AVS SNAPSHOT
[email or web address] Access Code: [email or web address]  Expires: 4/19/2017  1:36 PM    Your email address is not on file at [facility].  Email Addresses are required for you to sign up for [email or web address], please contact [phone] to verify your personal information and to provide your email address prior to attempting to register for [email or web address].    Matty Hernandez  [address] [place] [address]    [email or web address].com  A secure, online tool to manage your health information     [facility]’s Rentalroost.com® is a secure, online tool that connects you to your personalized health information from the privacy of your home -- day or night - making it very easy for you to manage your healthcare. Once the activation process is completed, you can even access your medical information using the Rentalroost.com kylee, which is available for free in the Apple Kylee store or Google Play store.     To learn more about [email or web address], visit [email or web address]    There are two levels of access available (as shown below):   My Chart Features  [facility] Primary Care Doctor [facility]  Specialists [facility]  Urgent  Care Non-[facility] Primary Care Doctor   Email your healthcare team securely and privately 24/7 X X X    Manage appointments: schedule your next appointment; view details of past/upcoming appointments X      Request prescription refills. X      View recent personal medical records, including lab and immunizations X X X X   View health record, including health history, allergies, medications X X X X   Read reports about your outpatient visits, procedures, consult and ER notes X X X X   See your discharge summary, which is a recap of your hospital and/or ER visit that includes your diagnosis, lab results, and care plan X X  X     How to register for [employer]:  Once your e-mail address has been verified, follow the following steps to sign up for [employer].     1. Go to  [email or web address]  2. Click on the Sign Up Now box, which takes you to the New Member Sign Up page. You will need to  provide the following information:  a. Enter your [employer] Access Code exactly as it appears at the top of this page. (You will not need to use this code after you’ve completed the sign-up process. If you do not sign up before the expiration date, you must request a new code.)   b. Enter your date of birth.   c. Enter your home email address.   d. Click Submit, and follow the next screen’s instructions.  3. Create a Visiarc ID. This will be your Visiarc login ID and cannot be changed, so think of one that is secure and easy to remember.  4. Create a Visiarc password. You can change your password at any time.  5. Enter your Password Reset Question and Answer. This can be used at a later time if you forget your password.   6. Enter your e-mail address. This allows you to receive e-mail notifications when new information is available in [employer].  7. Click Sign Up. You can now view your health information.    For assistance activating your Visi[employer] account, call [phone]

## 2017-03-21 ENCOUNTER — HOSPITAL ENCOUNTER (EMERGENCY)
Facility: MEDICAL CENTER | Age: 37
End: 2017-03-21
Attending: EMERGENCY MEDICINE
Payer: MEDICAID

## 2017-03-21 VITALS
HEART RATE: 92 BPM | OXYGEN SATURATION: 95 % | TEMPERATURE: 98.4 F | BODY MASS INDEX: 25.41 KG/M2 | WEIGHT: 177.47 LBS | SYSTOLIC BLOOD PRESSURE: 125 MMHG | HEIGHT: 70 IN | RESPIRATION RATE: 20 BRPM | DIASTOLIC BLOOD PRESSURE: 86 MMHG

## 2017-03-21 DIAGNOSIS — K02.9 PAIN DUE TO DENTAL CARIES: ICD-10-CM

## 2017-03-21 PROCEDURE — 99283 EMERGENCY DEPT VISIT LOW MDM: CPT

## 2017-03-21 PROCEDURE — 64400 NJX AA&/STRD TRIGEMINAL NRV: CPT

## 2017-03-21 PROCEDURE — 700111 HCHG RX REV CODE 636 W/ 250 OVERRIDE (IP): Performed by: EMERGENCY MEDICINE

## 2017-03-21 PROCEDURE — 700101 HCHG RX REV CODE 250

## 2017-03-21 RX ORDER — BUPIVACAINE HYDROCHLORIDE 2.5 MG/ML
20 INJECTION, SOLUTION EPIDURAL; INFILTRATION; INTRACAUDAL ONCE
Status: COMPLETED | OUTPATIENT
Start: 2017-03-21 | End: 2017-03-21

## 2017-03-21 RX ORDER — LIDOCAINE HYDROCHLORIDE AND EPINEPHRINE 10; 10 MG/ML; UG/ML
20 INJECTION, SOLUTION INFILTRATION; PERINEURAL ONCE
Status: DISCONTINUED | OUTPATIENT
Start: 2017-03-21 | End: 2017-03-21

## 2017-03-21 RX ORDER — HYDROCODONE BITARTRATE AND ACETAMINOPHEN 5; 325 MG/1; MG/1
1-2 TABLET ORAL EVERY 4 HOURS PRN
Qty: 10 TAB | Refills: 0 | Status: SHIPPED | OUTPATIENT
Start: 2017-03-21 | End: 2017-11-15

## 2017-03-21 RX ORDER — LIDOCAINE HYDROCHLORIDE AND EPINEPHRINE BITARTRATE 20; .01 MG/ML; MG/ML
INJECTION, SOLUTION SUBCUTANEOUS
Status: COMPLETED
Start: 2017-03-21 | End: 2017-03-21

## 2017-03-21 RX ORDER — LIDOCAINE HYDROCHLORIDE AND EPINEPHRINE BITARTRATE 20; .01 MG/ML; MG/ML
10 INJECTION, SOLUTION SUBCUTANEOUS ONCE
Status: COMPLETED | OUTPATIENT
Start: 2017-03-21 | End: 2017-03-21

## 2017-03-21 RX ADMIN — LIDOCAINE HYDROCHLORIDE AND EPINEPHRINE 10 ML: 20; 10 INJECTION, SOLUTION INFILTRATION; PERINEURAL at 15:00

## 2017-03-21 RX ADMIN — BUPIVACAINE HYDROCHLORIDE 20 ML: 2.5 INJECTION, SOLUTION EPIDURAL; INFILTRATION; INTRACAUDAL; PERINEURAL at 14:59

## 2017-03-21 RX ADMIN — LIDOCAINE HYDROCHLORIDE AND EPINEPHRINE BITARTRATE 10 ML: 20; .01 INJECTION, SOLUTION SUBCUTANEOUS at 15:00

## 2017-03-21 NOTE — ED PROVIDER NOTES
"ED Provider Note    CHIEF COMPLAINT  Chief Complaint   Patient presents with   • Dental Pain     pt reports that he has right lower dental abscess.        HPI  Matty Hernandez is a 37 y.o. male who presents dental pain right lower tooth. Patient's had dental pain for the last week or so. He has very poor dentition. Seen here yesterday + antibiotics but the pain is worse. He denies headache chest pain fevers or chills vomiting. Nothing makes it better or worse.    REVIEW OF SYSTEMS  CONSTITUTIONAL:  Denies fever, chills,   EYES:  Denies photophobia or discharge   ENT:  Denies sore throat, nose or ear pain. Positive right lower tooth pain  CARDIOVASCULAR:  Denies chest pain  RESPIRATORY:  Denies cough   GI:  Denies abdominal pain,   SKIN:  No facial redness  NEUROLOGIC:  Denies headache, focal weakness or numbness  PSYCHIATRIC:  Denies depression    PAST MEDICAL HISTORY  Methadone use  Poor dentition          SOCIAL HISTORY   reports that he has been smoking.  He does not have any smokeless tobacco history on file. He reports that he drinks alcohol. He reports that he uses illicit drugs (Intravenous and Inhaled).    SURGICAL HISTORY  No past surgical history on file.    CURRENT MEDICATIONS  No current facility-administered medications for this encounter.    Current outpatient prescriptions:   •  hydrocodone-acetaminophen (NORCO) 5-325 MG Tab per tablet, Take 1-2 Tabs by mouth every four hours as needed., Disp: 10 Tab, Rfl: 0  •  amoxicillin (AMOXIL) 500 MG Cap, Take 1 Cap by mouth 3 times a day for 7 days., Disp: 21 Cap, Rfl: 0  •  ibuprofen (MOTRIN) 800 MG Tab, Take 1 Tab by mouth every 8 hours as needed for Moderate Pain., Disp: 30 Tab, Rfl: 0      ALLERGIES  No Known Allergies    PHYSICAL EXAM  VITAL SIGNS: /86 mmHg  Pulse 92  Temp(Src) 36.9 °C (98.4 °F) (Temporal)  Resp 20  Ht 1.778 m (5' 10\")  Wt 80.5 kg (177 lb 7.5 oz)  BMI 25.46 kg/m2  SpO2 95%     Constitutional: Patient is awake alert person place and " time. No acute respiratory distress Well developed, Well nourished, Non-toxic appearance.   HENT: Normocephalic, Atraumatic, very poor dentition with multiple cavities. Right lower 1st premolar is carious down to the gumline he has no teeth farther back than this. He is very tender to that area but no obvious bulging abscess. He does have some mild swelling on the mandible adjacent to this tooth. Multiple cavities to the adjacent teeth. No signs of Milton angina. Uvula is midline.   Eyes:  Sclera and conjunctiva clear, No discharge.   Neck:  Supple no nuchal rigidity, positive submandibular lymph nodes right  Lymphatic: Positive submandibular lymph nodes right  Cardiovascular: Heart is regular rate and rhythm no murmur, rub or thrill.   Thorax & Lungs: Chest is symmetrical, with good breath sounds.   Skin: No facial cellulitis  Musculoskeletal: Good range of motion upper and lower extremities  Neurologic: Alert & oriented and ambulates without difficulty     RADIOLOGY/PROCEDURES  Dental block:  0.25% Marcaine mixed 50-50 with 2% lidocaine and epinephrine. I injected the base of the tooth as a dental block. He had marked pain relief after this and felt much better.      COURSE & MEDICAL DECISION MAKING  Pertinent Labs & Imaging studies reviewed. (See chart for details)  Patient with a dental cavity and mild swelling. I given a dental block which will help. He will continue his amoxicillin. He states he'll be seeing the [facility] dentist within 2 days. This time I do not believe he has Milton angina or cellulitis or sepsis. Believe he can be discharged in stable condition to see a dentist as an outpatient    FINAL IMPRESSION  1. Periapical abscess  2. Dental pain and cavities  3. Dental block by me     PLAN  1. Follow up with a dentist within 2 days the [facility]  2. Soft diet  3. Continue amoxicillin  4. Norco when necessary pain  5. Return to the emergency department for increased pains, fevers, vomiting or  change in condition.  Electronically signed by: Karl Sanchez, 3/21/2017 3:57 PM

## 2017-03-21 NOTE — ED NOTES
Received orders for DC. Pt states he feels better. [facility]. VSS. Ambulatory to lobby with steady gait.

## 2017-03-21 NOTE — ED AVS SNAPSHOT
3/21/2017          Matty Hernandez  [address]  [place] [address]    Dear Matty:    [facility] wants to ensure your discharge home is safe and you or your loved ones have had all your questions answered regarding your care after you leave the hospital.    You may receive a telephone call within two days of your discharge.  This call is to make certain you understand your discharge instructions as well as ensure we provided you with the best care possible during your stay with us.     The call will only last approximately 3-5 minutes and will be done by a nurse.    Once again, we want to ensure your discharge home is safe and that you have a clear understanding of any next steps in your care.  If you have any questions or concerns, please do not hesitate to contact us, we are here for you.  Thank you for choosing [facility] for your healthcare needs.    Sincerely,    Bruce Sanchez    [facility]

## 2017-03-21 NOTE — ED AVS SNAPSHOT
Home Care Instructions                                                                                                                Matty Hernandez   MRN: [id number]    Department:  [facility], Emergency Dept   Date of Visit:  3/21/2017            [facility], Emergency Dept    [address]    [place] [address]    Phone:  [phone]      You were seen by     Karl Sanchez M.D.      Your Diagnosis Was     Pain due to dental caries     K02.9       These are the medications you received during your hospitalization from 03/21/2017 1215 to 03/21/2017 1528     Date/Time Order Dose Route Action    03/21/2017 1459 bupivacaine 0.25% (SENSORCAINE-MARCAINE) pf injection 20 mL 20 mL Injection Given      Follow-up Information     1. Follow up with [facility] In 2 days.    Contact information    [address]  [place] [address] [phone]        Medication Information     Review all of your home medications and newly ordered medications with your primary doctor and/or pharmacist as soon as possible. Follow medication instructions as directed by your doctor and/or pharmacist.     Please keep your complete medication list with you and share with your physician. Update the information when medications are discontinued, doses are changed, or new medications (including over-the-counter products) are added; and carry medication information at all times in the event of emergency situations.               Medication List      START taking these medications        Instructions    Morning Afternoon Evening Bedtime    hydrocodone-acetaminophen 5-325 MG Tabs per tablet   Commonly known as:  NORCO        Take 1-2 Tabs by mouth every four hours as needed.   Dose:  1-2 Tab                          ASK your doctor about these medications        Instructions    Morning Afternoon Evening Bedtime    amoxicillin 500 MG Caps   Commonly known as:  AMOXIL        Take 1 Cap by mouth 3 times a day for 7  days.   Dose:  500 mg                        ibuprofen 800 MG Tabs   Commonly known as:  MOTRIN        Take 1 Tab by mouth every 8 hours as needed for Moderate Pain.   Dose:  800 mg                             Where to Get Your Medications      You can get these medications from any pharmacy     Bring a paper prescription for each of these medications    - hydrocodone-acetaminophen 5-325 MG Tabs per tablet              Discharge Instructions       Dental Caries  Dental caries is tooth decay. This decay can cause a hole in teeth (cavity) that can get bigger and deeper over time.  HOME CARE  · Brush and floss your teeth. Do this at least two times a day.  · Use a fluoride toothpaste.  · Use a mouth rinse if told by your dentist or doctor.  · Eat less sugary and starchy foods. Drink less sugary drinks.  · Avoid snacking often on sugary and starchy foods. Avoid sipping often on sugary drinks.  · Keep regular checkups and cleanings with your dentist.  · Use fluoride supplements if told by your dentist or doctor.  · Allow fluoride to be applied to teeth if told by your dentist or doctor.     This information is not intended to replace advice given to you by your health care provider. Make sure you discuss any questions you have with your health care provider.     Document Released: 09/26/2009 Document Revised: 01/08/2016 Document Reviewed: 12/20/2013  [employer] Interactive Patient Education ©2016 [employer]    Dental Pain  Dental pain may be caused by many things, including:  · Tooth decay (cavities or caries). Cavities cause the nerve of your tooth to be open to air and hot or cold temperatures. This can cause pain or discomfort.  · Abscess or infection. A dental abscess is an area that is full of infected pus from a bacterial infection in the inner part of the tooth (pulp). It usually happens at the end of the tooth's root.  · Injury.  · An unknown reason (idiopathic).  Your pain may be mild or severe. It may only  happen when:  · You are chewing.  · You are exposed to hot or cold temperature.  · You are eating or drinking sugary foods or beverages, such as:  ¨ Soda.  ¨ Candy.  Your pain may also be there all of the time.  HOME CARE  Watch your dental pain for any changes. Do these things to lessen your discomfort:  · Take medicines only as told by your dentist.  · If your dentist tells you to take an antibiotic medicine, finish all of it even if you start to feel better.  · Keep all follow-up visits as told by your dentist. This is important.  · Do not apply heat to the outside of your face.  · Rinse your mouth or gargle with salt water if told by your dentist. This helps with pain and swelling.  ¨ You can make salt water by adding ¼ tsp of salt to 1 cup of warm water.  · Apply ice to the painful area of your face:  ¨ Put ice in a plastic bag.  ¨ Place a towel between your skin and the bag.  ¨ Leave the ice on for 20 minutes, 2-3 times per day.  · Avoid foods or drinks that cause you pain, such as:  ¨ Very hot or very cold foods or drinks.  ¨ Sweet or sugary foods or drinks.  GET HELP IF:  · Your pain is not helped with medicines.  · Your symptoms are worse.  · You have new symptoms.  GET HELP RIGHT AWAY IF:  · You cannot open your mouth.  · You are having trouble breathing or swallowing.  · You have a fever.  · Your face, neck, or jaw is puffy (swollen).     This information is not intended to replace advice given to you by your health care provider. Make sure you discuss any questions you have with your health care provider.     Document Released: 06/05/2009 Document Revised: 05/03/2016 Document Reviewed: 12/14/2015  [employer] Interactive Patient Education ©2016 [employer]            Patient Information     Patient Information    Following emergency treatment: all patient requiring follow-up care must return either to a private physician or a clinic if your condition worsens before you are able to obtain further medical  attention, please return to the emergency room.     Billing Information    At [facility], we work to make the billing process streamlined for our patients.  Our Representatives are here to answer any questions you may have regarding your hospital bill.  If you have insurance coverage and have supplied your insurance information to us, we will submit a claim to your insurer on your behalf.  Should you have any questions regarding your bill, we can be reached online or by phone as follows:  Online: You are able pay your bills online or live chat with our representatives about any billing questions you may have. We are here to help Monday - Friday from 8:00am to 7:30pm and 9:00am - 12:00pm on Saturdays.  Please visit [email or web address]  for more information.   Phone:  [phone] or [phone]    Please note that your emergency physician, surgeon, pathologist, radiologist, anesthesiologist, and other specialists are not employed by [facility] and will therefore bill separately for their services.  Please contact them directly for any questions concerning their bills at the numbers below:     Emergency Physician Services:  [phone]  [facility]:  [phone]  [facility]:  [phone]  [facility]:  [phone]    1. Your final bill may vary from the amount quoted upon discharge if all procedures are not complete at that time, or if your doctor has additional procedures of which we are not aware. You will receive an additional bill if you return to the Emergency Department at [facility] for suture removal regardless of the facility of which the sutures were placed.     2. Please arrange for settlement of this account at the emergency registration.    3. All self-pay accounts are due in full at the time of treatment.  If you are unable to meet this obligation then payment is expected within 4-5 days.     4. If you have had  radiology studies (CT, X-ray, Ultrasound, MRI), you have received a preliminary result during your emergency department visit. Please contact the radiology department [phone] to receive a copy of your final result. Please discuss the Final result with your primary physician or with the follow up physician provided.     Crisis Hotline:  [place] Crisis Hotline:  6-365-UQMWYBE or 1-150.286.4957  [place] Crisis Hotline:    1-876.425.2823 or 584-610-9022         ED Discharge Follow Up Questions    1. In order to provide you with very good care, we would like to follow up with a phone call in the next few days.  May we have your permission to contact you?     YES /  NO    2. What is the best phone number to call you? (       )_____-__________    3. What is the best time to call you?      Morning  /  Afternoon  /  Evening                   Patient Signature:  ____________________________________________________________    Date:  ____________________________________________________________

## 2017-03-21 NOTE — ED NOTES
"Chief Complaint   Patient presents with   • Dental Pain     pt reports that he has right lower dental abscess.      Seen here yesterday for same.  Blood pressure 126/81, pulse 98, temperature 36.5 °C (97.7 °F), temperature source Temporal, resp. rate 16, height 1.778 m (5' 10\"), weight 80.5 kg (177 lb 7.5 oz), SpO2 97 %.  Pt informed of wait times. Educated on triage process.  Asked to return to triage RN for any new or worsening of symptoms. Thanked for patience.        "

## 2017-03-21 NOTE — DISCHARGE INSTRUCTIONS
Dental Caries  Dental caries is tooth decay. This decay can cause a hole in teeth (cavity) that can get bigger and deeper over time.  HOME CARE  · Brush and floss your teeth. Do this at least two times a day.  · Use a fluoride toothpaste.  · Use a mouth rinse if told by your dentist or doctor.  · Eat less sugary and starchy foods. Drink less sugary drinks.  · Avoid snacking often on sugary and starchy foods. Avoid sipping often on sugary drinks.  · Keep regular checkups and cleanings with your dentist.  · Use fluoride supplements if told by your dentist or doctor.  · Allow fluoride to be applied to teeth if told by your dentist or doctor.     This information is not intended to replace advice given to you by your health care provider. Make sure you discuss any questions you have with your health care provider.     Document Released: 09/26/2009 Document Revised: 01/08/2016 Document Reviewed: 12/20/2013  [employer] Interactive Patient Education ©2016 [employer]    Dental Pain  Dental pain may be caused by many things, including:  · Tooth decay (cavities or caries). Cavities cause the nerve of your tooth to be open to air and hot or cold temperatures. This can cause pain or discomfort.  · Abscess or infection. A dental abscess is an area that is full of infected pus from a bacterial infection in the inner part of the tooth (pulp). It usually happens at the end of the tooth's root.  · Injury.  · An unknown reason (idiopathic).  Your pain may be mild or severe. It may only happen when:  · You are chewing.  · You are exposed to hot or cold temperature.  · You are eating or drinking sugary foods or beverages, such as:  ¨ Soda.  ¨ Candy.  Your pain may also be there all of the time.  HOME CARE  Watch your dental pain for any changes. Do these things to lessen your discomfort:  · Take medicines only as told by your dentist.  · If your dentist tells you to take an antibiotic medicine, finish all of it even if you start to  feel better.  · Keep all follow-up visits as told by your dentist. This is important.  · Do not apply heat to the outside of your face.  · Rinse your mouth or gargle with salt water if told by your dentist. This helps with pain and swelling.  ¨ You can make salt water by adding ¼ tsp of salt to 1 cup of warm water.  · Apply ice to the painful area of your face:  ¨ Put ice in a plastic bag.  ¨ Place a towel between your skin and the bag.  ¨ Leave the ice on for 20 minutes, 2-3 times per day.  · Avoid foods or drinks that cause you pain, such as:  ¨ Very hot or very cold foods or drinks.  ¨ Sweet or sugary foods or drinks.  GET HELP IF:  · Your pain is not helped with medicines.  · Your symptoms are worse.  · You have new symptoms.  GET HELP RIGHT AWAY IF:  · You cannot open your mouth.  · You are having trouble breathing or swallowing.  · You have a fever.  · Your face, neck, or jaw is puffy (swollen).     This information is not intended to replace advice given to you by your health care provider. Make sure you discuss any questions you have with your health care provider.     Document Released: 06/05/2009 Document Revised: 05/03/2016 Document Reviewed: 12/14/2015  [employer] Interactive Patient Education ©2016 [employer]

## 2017-07-18 ENCOUNTER — NON-PROVIDER VISIT (OUTPATIENT)
Dept: OCCUPATIONAL MEDICINE | Facility: CLINIC | Age: 37
End: 2017-07-18

## 2017-07-18 DIAGNOSIS — Z02.1 PRE-EMPLOYMENT DRUG SCREENING: ICD-10-CM

## 2017-07-18 DIAGNOSIS — Z02.1 PRE-EMPLOYMENT DRUG TESTING: ICD-10-CM

## 2017-07-18 LAB
AMP AMPHETAMINE: NORMAL
COC COCAINE: NORMAL
INT CON NEG: NORMAL
INT CON POS: NORMAL
MET METHAMPHETAMINES: NORMAL
OPI OPIATES: NORMAL
PCP PHENCYCLIDINE: NORMAL
POC DRUG COMMENT 753798-OCCUPATIONAL HEALTH: NORMAL
THC: NORMAL

## 2017-07-18 PROCEDURE — 80305 DRUG TEST PRSMV DIR OPT OBS: CPT | Performed by: PREVENTIVE MEDICINE

## 2017-07-25 ENCOUNTER — NON-PROVIDER VISIT (OUTPATIENT)
Dept: OCCUPATIONAL MEDICINE | Facility: CLINIC | Age: 37
End: 2017-07-25

## 2017-07-25 DIAGNOSIS — Z02.1 DRUG SCREENING, PRE-EMPLOYMENT: ICD-10-CM

## 2017-07-25 PROCEDURE — 8911 PR MRO FEE: Performed by: INTERNAL MEDICINE

## 2017-07-25 NOTE — MR AVS SNAPSHOT
Matty Hernandez   2017 8:10 AM   Appointment   MRN: [id number]    Department:  [facility]   Dept Phone:  [phone]    Description:  Male : 1980   Provider:  [place] NON [facility]           Allergies as of 2017     No Known Allergies      Vital Signs     Smoking Status                   Current Some Day Smoker           Basic Information     Date Of Birth Sex Race Ethnicity Preferred Language    1980 Male White Non- English      Health Maintenance     Patient has no pending health maintenance at this time      Current Immunizations     Influenza TIV (IM) 2010  1:50 PM    Pneumococcal polysaccharide vaccine (PPSV-23) 2010 11:33 AM      Below and/or attached are the medications your provider expects you to take. Review all of your home medications and newly ordered medications with your provider and/or pharmacist. Follow medication instructions as directed by your provider and/or pharmacist. Please keep your medication list with you and share with your provider. Update the information when medications are discontinued, doses are changed, or new medications (including over-the-counter products) are added; and carry medication information at all times in the event of emergency situations     Allergies:  No Known Allergies          Medications  Valid as of: 2017 -  9:21 AM    Generic Name Brand Name Tablet Size Instructions for use    Hydrocodone-Acetaminophen (Tab) NORCO 5-325 MG Take 1-2 Tabs by mouth every four hours as needed.        Ibuprofen (Tab) MOTRIN 800 MG Take 1 Tab by mouth every 8 hours as needed for Moderate Pain.        .                 Medicines prescribed today were sent to:     [id number] - [place] - [address] [address]    [address] [address] [place] [address]    Phone: [phone] Fax: [phone]    Open 24 Hours?: No      Medication refill instructions:       If your prescription bottle indicates you have medication refills left,  it is not necessary to call your provider’s office. Please contact your pharmacy and they will refill your medication.    If your prescription bottle indicates you do not have any refills left, you may request refills at any time through one of the following ways: The online [employer] system (except Urgent Care), by calling your provider’s office, or by asking your pharmacy to contact your provider’s office with a refill request. Medication refills are processed only during regular business hours and may not be available until the next business day. Your provider may request additional information or to have a follow-up visit with you prior to refilling your medication.   *Please Note: Medication refills are assigned a new Rx number when refilled electronically. Your pharmacy may indicate that no refills were authorized even though a new prescription for the same medication is available at the pharmacy. Please request the medicine by name with the pharmacy before contacting your provider for a refill.           [employer] Access Code: [email or web address]  Expires: 8/17/2017  3:52 PM    [employer]  A secure, online tool to manage your health information     [employer]’s [employer]® is a secure, online tool that connects you to your personalized health information from the privacy of your home -- day or night - making it very easy for you to manage your healthcare. Once the activation process is completed, you can even access your medical information using the [employer] kylee, which is available for free in the Apple Kylee store or Google Play store.     [employer] provides the following levels of access (as shown below):   My Chart Features   [facility] Primary Care Doctor [facility]  Specialists [facility]  Urgent  Care Non-[facility]  Primary Care  Doctor   Email your healthcare team securely and privately 24/7 X X X    Manage appointments: schedule your next appointment; view details of past/upcoming appointments X      Request prescription  refills. X      View recent personal medical records, including lab and immunizations X X X X   View health record, including health history, allergies, medications X X X X   Read reports about your outpatient visits, procedures, consult and ER notes X X X X   See your discharge summary, which is a recap of your hospital and/or ER visit that includes your diagnosis, lab results, and care plan. X X       How to register for [employer]:  1. Go to  [email or web address].  2. Click on the Sign Up Now box, which takes you to the New Member Sign Up page. You will need to provide the following information:  a. Enter your [employer] Access Code exactly as it appears at the top of this page. (You will not need to use this code after you’ve completed the sign-up process. If you do not sign up before the expiration date, you must request a new code.)   b. Enter your date of birth.   c. Enter your home email address.   d. Click Submit, and follow the next screen’s instructions.  3. Create a Seamless ID. This will be your [employer] login ID and cannot be changed, so think of one that is secure and easy to remember.  4. Create a Se[employer] password. You can change your password at any time.  5. Enter your Password Reset Question and Answer. This can be used at a later time if you forget your password.   6. Enter your e-mail address. This allows you to receive e-mail notifications when new information is available in [employer].  7. Click Sign Up. You can now view your health information.    For assistance activating your [employer] account, call [phone]        Quit Tobacco Information     Do you want to quit using tobacco?    Quitting tobacco decreases risks of cancer, heart and lung disease, increases life expectancy, improves sense of taste and smell, and increases spending money, among other benefits.    If you are thinking about quitting, we can help.  • [facility] Quit Tobacco Program: 950.580.8395  o Program occurs weekly for four  weeks and includes pharmacist consultation on products to support quitting smoking or chewing tobacco. A provider referral is needed for pharmacist consultation.  • Tobacco Users Help Hotline: 7-579-QUIT-NOW (514-9191) or https://[place].quitlogix.org/  o Free, confidential telephone and online coaching for [place] residents. Sessions are designed on a schedule that is convenient for you. Eligible clients receive free nicotine replacement therapy.  • Nationally: www.smokefree.gov  o Information and professional assistance to support both immediate and long-term needs as you become, and remain, a non-smoker. Smokefree.gov allows you to choose the help that best fits your needs.

## 2017-11-15 ENCOUNTER — HOSPITAL ENCOUNTER (EMERGENCY)
Facility: MEDICAL CENTER | Age: 37
End: 2017-11-15
Attending: EMERGENCY MEDICINE
Payer: MEDICAID

## 2017-11-15 ENCOUNTER — APPOINTMENT (OUTPATIENT)
Dept: RADIOLOGY | Facility: MEDICAL CENTER | Age: 37
End: 2017-11-15
Attending: EMERGENCY MEDICINE
Payer: MEDICAID

## 2017-11-15 VITALS
RESPIRATION RATE: 20 BRPM | OXYGEN SATURATION: 96 % | SYSTOLIC BLOOD PRESSURE: 122 MMHG | BODY MASS INDEX: 22.19 KG/M2 | WEIGHT: 155 LBS | HEIGHT: 70 IN | HEART RATE: 72 BPM | DIASTOLIC BLOOD PRESSURE: 82 MMHG | TEMPERATURE: 98.2 F

## 2017-11-15 DIAGNOSIS — F15.10 METHAMPHETAMINE ABUSE (HCC): ICD-10-CM

## 2017-11-15 LAB
ALBUMIN SERPL BCP-MCNC: 4.8 G/DL (ref 3.2–4.9)
ALBUMIN/GLOB SERPL: 1.5 G/DL
ALP SERPL-CCNC: 80 U/L (ref 30–99)
ALT SERPL-CCNC: 39 U/L (ref 2–50)
ANION GAP SERPL CALC-SCNC: 11 MMOL/L (ref 0–11.9)
APTT PPP: 22.7 SEC (ref 24.7–36)
AST SERPL-CCNC: 31 U/L (ref 12–45)
BASOPHILS # BLD AUTO: 0.7 % (ref 0–1.8)
BASOPHILS # BLD: 0.03 K/UL (ref 0–0.12)
BILIRUB SERPL-MCNC: 1.4 MG/DL (ref 0.1–1.5)
BNP SERPL-MCNC: 9 PG/ML (ref 0–100)
BUN SERPL-MCNC: 9 MG/DL (ref 8–22)
CALCIUM SERPL-MCNC: 9.7 MG/DL (ref 8.5–10.5)
CHLORIDE SERPL-SCNC: 105 MMOL/L (ref 96–112)
CO2 SERPL-SCNC: 23 MMOL/L (ref 20–33)
CREAT SERPL-MCNC: 0.92 MG/DL (ref 0.5–1.4)
EKG IMPRESSION: NORMAL
EOSINOPHIL # BLD AUTO: 0.04 K/UL (ref 0–0.51)
EOSINOPHIL NFR BLD: 0.9 % (ref 0–6.9)
ERYTHROCYTE [DISTWIDTH] IN BLOOD BY AUTOMATED COUNT: 42.7 FL (ref 35.9–50)
GFR SERPL CREATININE-BSD FRML MDRD: >60 ML/MIN/1.73 M 2
GLOBULIN SER CALC-MCNC: 3.1 G/DL (ref 1.9–3.5)
GLUCOSE SERPL-MCNC: 89 MG/DL (ref 65–99)
HCT VFR BLD AUTO: 47 % (ref 42–52)
HGB BLD-MCNC: 16.7 G/DL (ref 14–18)
IMM GRANULOCYTES # BLD AUTO: 0.01 K/UL (ref 0–0.11)
IMM GRANULOCYTES NFR BLD AUTO: 0.2 % (ref 0–0.9)
INR PPP: 1.08 (ref 0.87–1.13)
LIPASE SERPL-CCNC: 11 U/L (ref 11–82)
LYMPHOCYTES # BLD AUTO: 1.7 K/UL (ref 1–4.8)
LYMPHOCYTES NFR BLD: 38.7 % (ref 22–41)
MCH RBC QN AUTO: 32.6 PG (ref 27–33)
MCHC RBC AUTO-ENTMCNC: 35.5 G/DL (ref 33.7–35.3)
MCV RBC AUTO: 91.8 FL (ref 81.4–97.8)
MONOCYTES # BLD AUTO: 0.38 K/UL (ref 0–0.85)
MONOCYTES NFR BLD AUTO: 8.7 % (ref 0–13.4)
NEUTROPHILS # BLD AUTO: 2.23 K/UL (ref 1.82–7.42)
NEUTROPHILS NFR BLD: 50.8 % (ref 44–72)
NRBC # BLD AUTO: 0 K/UL
NRBC BLD AUTO-RTO: 0 /100 WBC
PLATELET # BLD AUTO: 224 K/UL (ref 164–446)
PMV BLD AUTO: 8.9 FL (ref 9–12.9)
POTASSIUM SERPL-SCNC: 3.6 MMOL/L (ref 3.6–5.5)
PROT SERPL-MCNC: 7.9 G/DL (ref 6–8.2)
PROTHROMBIN TIME: 13.7 SEC (ref 12–14.6)
RBC # BLD AUTO: 5.12 M/UL (ref 4.7–6.1)
SODIUM SERPL-SCNC: 139 MMOL/L (ref 135–145)
TROPONIN I SERPL-MCNC: <0.01 NG/ML (ref 0–0.04)
WBC # BLD AUTO: 4.4 K/UL (ref 4.8–10.8)

## 2017-11-15 PROCEDURE — 85025 COMPLETE CBC W/AUTO DIFF WBC: CPT

## 2017-11-15 PROCEDURE — 80053 COMPREHEN METABOLIC PANEL: CPT

## 2017-11-15 PROCEDURE — 96374 THER/PROPH/DIAG INJ IV PUSH: CPT

## 2017-11-15 PROCEDURE — 83880 ASSAY OF NATRIURETIC PEPTIDE: CPT

## 2017-11-15 PROCEDURE — 83690 ASSAY OF LIPASE: CPT

## 2017-11-15 PROCEDURE — 36415 COLL VENOUS BLD VENIPUNCTURE: CPT

## 2017-11-15 PROCEDURE — 700111 HCHG RX REV CODE 636 W/ 250 OVERRIDE (IP): Performed by: EMERGENCY MEDICINE

## 2017-11-15 PROCEDURE — 85610 PROTHROMBIN TIME: CPT

## 2017-11-15 PROCEDURE — 93005 ELECTROCARDIOGRAM TRACING: CPT | Performed by: EMERGENCY MEDICINE

## 2017-11-15 PROCEDURE — 85730 THROMBOPLASTIN TIME PARTIAL: CPT

## 2017-11-15 PROCEDURE — 90791 PSYCH DIAGNOSTIC EVALUATION: CPT

## 2017-11-15 PROCEDURE — 99285 EMERGENCY DEPT VISIT HI MDM: CPT

## 2017-11-15 PROCEDURE — 84484 ASSAY OF TROPONIN QUANT: CPT

## 2017-11-15 RX ORDER — RISPERIDONE 2 MG/1
2 TABLET ORAL 2 TIMES DAILY
COMMUNITY
End: 2018-10-23

## 2017-11-15 RX ORDER — LORAZEPAM 2 MG/ML
0.5 INJECTION INTRAMUSCULAR ONCE
Status: COMPLETED | OUTPATIENT
Start: 2017-11-15 | End: 2017-11-15

## 2017-11-15 RX ADMIN — LORAZEPAM 0.5 MG: 2 INJECTION INTRAMUSCULAR; INTRAVENOUS at 04:55

## 2017-11-15 NOTE — ED NOTES
MD at bedside prior to d/c. Pt given d/c instruction and verbalized understanding. No rx's given. Pt ambulatory to lobby, gait steady. Pt transferred to [facility] with staff. Pt took all belongings from room.

## 2017-11-15 NOTE — ED NOTES
Pt given lunch box per request. Pt to be transferred to [facility] between [id number].
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
## 2017-11-15 NOTE — DISCHARGE PLANNING
"    [facility]  Crisis/Safety Plan    Name:  Matty Hernandez  MRN:  [id number]  Date:  11/15/2017    Warning signs that a crisis may be developing for me or I may be at risk:  1) Isolation  2) Using drugs again  3) Not caring for self    Coping strategies I can use on my own (relaxation, physical activity, etc):  1) Call for help or get help at [facility]  2) Stay away from people who do use or sale drugs  3) Get adequate sleep and nutrition     Ways I can make my environment safe:  1)  Stay away from drugs  2)   Get counseling for coping skills   3)  Get healthy support system, NA, AA    Things I want to tell myself when I feel a crisis developin) \"you don't have to do this.\"   2)   I can get help  3)  DON'T USE!!!!    People I can contact for support or distraction (and their phone numbers):  1) Parents   2)  Call [facility]  3)     If I’m not able to reach my support people, or the above strategies don’t help, I can contact the following professionals, agencies, or hotlines:  1) Crisis Call Center ():  [phone] -OR- [phone]  2) Crisis Text Line ():  Text START to 887393  3) Call Parents  4)     Selam Engle R.N.    "

## 2017-11-15 NOTE — ED NOTES
Pt bib ambulance c/o hallucinations, paranoia, anxiety secondary to meth use. Pt states earlier he was hearing voices talking about assassin stuff. Pt states he does not hear voices when not taking meth. Pt states he smoked meth 3 hours ago, he has consumed 1 beer and smoked some marijuana. Pt states he has been smoking more meth than usually lately. States for the last 2 months he has been smoking all day every day. Pt has hx of anxiety, depression, bipolar schizo effective disorder. Pt states he has not been taking his medication.

## 2017-11-15 NOTE — DISCHARGE PLANNING
"Medical Social Work    MSW received a call from bedside RN requesting substance abuse resources for pt.  MSW reviewed pt's chart and triage note shows that pt comes in with complaints of \"hallucinations, paranoia, anxiety secondary to meth use\".  Per RN pt had a recent loss of a loved one and is hiding behind a stool in the room.  MSW staffed with Vasile with [facility] about the possible need for a psych assessment.  Vasile met briefly with pt and believes that a [facility] assessment is appropriate at this time.  Vasile will notify AM [facility] worker and ERP.  Bedside RN aware.  MSW provided bedside RN with substance abuse resources for pt should pt be D/C'd after assessment.  "

## 2017-11-15 NOTE — ED PROVIDER NOTES
"ED Provider Note    CHIEF COMPLAINT  Chief Complaint   Patient presents with   • Hallucinations     Secondary to meth use   • Paranoid   • Anxiety       HPI  Matty Hernandez is a 37 y.o. male who presents to the emergency department feeling paranoid and having hallucinations. Patient has a history of methamphetamine abuse. He states that when he injects methamphetamine he tends to be quite paranoid and sometimes will see things that are not there. For this reason he stopped injecting drugs and is just been smoking for amphetamine recently. However, tonight he decided to inject methamphetamine again. This resulted in similar symptoms. He feels extremely anxious and nervous. He is seeing things and people that are not there. He is not suicidal or homicidal. He denies medical symptoms with this. He has not had chest pain, shortness of breath, headache, focal weakness or numbness, slurred speech or confusion otherwise. He has not had a fever. No neck stiffness.Denies suicidal or homicidal ideation    REVIEW OF SYSTEMS  As per HPI, otherwise a 10 point review of systems is negative    PAST MEDICAL HISTORY  Past Medical History:   Diagnosis Date   • Bipolar I disorder with depression (CMS-HCC)    • Schizo-affective schizophrenia, [place]        SOCIAL HISTORY  Social History   Substance Use Topics   • Smoking status: Current Some Day Smoker   • Smokeless tobacco: Never Used   • Alcohol use Yes      Comment: occ       SURGICAL HISTORY  History reviewed. No pertinent surgical history.    CURRENT MEDICATIONS  Home Medications     Reviewed by Lizz Alex R.N. (Registered Nurse) on 11/15/17 at 0411  Med List Status: Not Addressed   Medication Last Dose Status   Divalproex Sodium (DEPAKOTE PO)  Active   risperidone (RISPERDAL) 2 MG Tab  Active                ALLERGIES  No Known Allergies    PHYSICAL EXAM  VITAL SIGNS: /86   Pulse 74   Temp 36.6 °C (97.9 °F)   Resp (!) 30   Ht 1.778 m (5' 10\")  "  Wt 70.3 kg (155 lb)   SpO2 96%   BMI 22.24 kg/m²    Constitutional: Awake and alert, Anxious  HENT:  Atraumatic, Normocephalic.Oropharynx moist mucus membranes, Nose normal inspection.   Eyes: Normal inspection  Neck: Supple  Cardiovascular: Normal heart rate, Normal rhythm.  Symmetric peripheral pulses.   Thorax & Lungs: No respiratory distress, No wheezing, No rales, No rhonchi, No chest tenderness.   Abdomen: Bowel sounds normal, soft, non-distended, nontender, no mass  Skin: Injection site noted right arm. He has scars on his upper extremities from prior cutting behavior.  Extremities: As above No asymmetric swelling  Neurologic: Awake alert oriented. Symmetric motor and sensory  Psychiatric: Anxious appearing    Nursing staff entered protocol driven order set. Attempted to cancel orders, but I was unsuccessful.    Labs:  Results for orders placed or performed during the hospital encounter of 11/15/17   Troponin   Result Value Ref Range    Troponin I <0.01 0.00 - 0.04 ng/mL   Btype Natriuretic Peptide   Result Value Ref Range    B Natriuretic Peptide 9 0 - 100 pg/mL   CBC with Differential   Result Value Ref Range    WBC 4.4 (L) 4.8 - 10.8 K/uL    RBC 5.12 4.70 - 6.10 M/uL    Hemoglobin 16.7 14.0 - 18.0 g/dL    Hematocrit 47.0 42.0 - 52.0 %    MCV 91.8 81.4 - 97.8 fL    MCH 32.6 27.0 - 33.0 pg    MCHC 35.5 (H) 33.7 - 35.3 g/dL    RDW 42.7 35.9 - 50.0 fL    Platelet Count 224 164 - 446 K/uL    MPV 8.9 (L) 9.0 - 12.9 fL    Neutrophils-Polys 50.80 44.00 - 72.00 %    Lymphocytes 38.70 22.00 - 41.00 %    Monocytes 8.70 0.00 - 13.40 %    Eosinophils 0.90 0.00 - 6.90 %    Basophils 0.70 0.00 - 1.80 %    Immature Granulocytes 0.20 0.00 - 0.90 %    Nucleated RBC 0.00 /100 WBC    Neutrophils (Absolute) 2.23 1.82 - 7.42 K/uL    Lymphs (Absolute) 1.70 1.00 - 4.80 K/uL    Monos (Absolute) 0.38 0.00 - 0.85 K/uL    Eos (Absolute) 0.04 0.00 - 0.51 K/uL    Baso (Absolute) 0.03 0.00 - 0.12 K/uL    Immature Granulocytes (abs)  0.01 0.00 - 0.11 K/uL    NRBC (Absolute) 0.00 K/uL   Complete Metabolic Panel (CMP)   Result Value Ref Range    Sodium 139 135 - 145 mmol/L    Potassium 3.6 3.6 - 5.5 mmol/L    Chloride 105 96 - 112 mmol/L    Co2 23 20 - 33 mmol/L    Anion Gap 11.0 0.0 - 11.9    Glucose 89 65 - 99 mg/dL    Bun 9 8 - 22 mg/dL    Creatinine 0.92 0.50 - 1.40 mg/dL    Calcium 9.7 8.5 - 10.5 mg/dL    AST(SGOT) 31 12 - 45 U/L    ALT(SGPT) 39 2 - 50 U/L    Alkaline Phosphatase 80 30 - 99 U/L    Total Bilirubin 1.4 0.1 - 1.5 mg/dL    Albumin 4.8 3.2 - 4.9 g/dL    Total Protein 7.9 6.0 - 8.2 g/dL    Globulin 3.1 1.9 - 3.5 g/dL    A-G Ratio 1.5 g/dL   Prothrombin Time   Result Value Ref Range    PT 13.7 12.0 - 14.6 sec    INR 1.08 0.87 - 1.13   APTT   Result Value Ref Range    APTT 22.7 (L) 24.7 - 36.0 sec   Lipase   Result Value Ref Range    Lipase 11 11 - 82 U/L   ESTIMATED GFR   Result Value Ref Range    GFR If African American >60 >60 mL/min/1.73 m 2    GFR If Non African American >60 >60 mL/min/1.73 m 2   EKG (ER)   Result Value Ref Range    Report       [facility] Emergency Dept.    Test Date:  2017-11-15  Pt Name:    WYATT ORONA                   Department: ER  MRN:        [id number]                      Room:       RD 09  Gender:     M                            Technician: [id number]  :        1980                   Requested By:DOMI DUNN  Order #:    [id number]                    Reading MD: DOMI DUNN MD    Measurements  Intervals                                Axis  Rate:       77                           P:          45  WV:         156                          QRS:        104  QRSD:       88                           T:          46  QT:         396  QTc:        449    Interpretive Statements  SINUS RHYTHM    BORDERLINE LOW VOLTAGE IN FRONTAL LEADS  Compared to ECG 2010 01:21:57  Right-axis deviation now present  Sinus tachycardia no longer present    Electronically Signed On 11-  4:39:33 PST by DOMI DUNN MD             COURSE & MEDICAL DECISION MAKING  The patient presents with anxiety and apparent psychosis related to methamphetamine use. An IV was started. I ordered Ativan intravenously. The patient was observed in the emergency department. Nursing staff ordered data. This returned as noted above.    Patient felt improved following Ativan and observation ER. He was still somewhat paranoid while here in the ER. He does have a history of mental illness. Nursing consulted [facility]. Please see the [facility] consultation. Arrangements are being made for him to go to [facility] for housing and drug rehab. He will return to the ER for medical concerns or complaints.    Patient referred to primary provider for blood pressure management    FINAL IMPRESSION  1. Methamphetamine abuse      This dictation was created using voice recognition software. The accuracy of the dictation is limited to the abilities of the software.  The nursing notes were reviewed and certain aspects of this information were incorporated into this note.      Electronically signed by: Domi Dunn, 11/15/2017 5:03 AM

## 2017-11-15 NOTE — ED NOTES
"Pt is hiding behind stool in room. Pt states \"I know it is not real and that I'm being crazy.\" Pt states he wants to quit doing drugs and go to rehab. Alert team notified and will be in to see pt shortly.    "

## 2017-11-15 NOTE — DISCHARGE INSTRUCTIONS
Amphetamine Abuse  Meth and other amphetamines over-stimulate the nervous system. This gives you a false feeling of power and confidence. Amphetamines once came in the form of diet pills. This is no longer considered a valid reason to use the drug. More often they are bought as the illegal drug, methamphetamine. It is also known as crank, crystal, speed, or ice. Meth and similar drugs can cause a variety of problems. They can cause severe physical and psychological problems.  SYMPTOMS   · Reduced appetite.  · Dry mouth.  · Erectile dysfunction.  · Headache.  · Fever and sweating.  · Diarrhea or constipation.  · Blurred vision and impaired speech.  · Dizziness, uncontrollable movements or shaking.  · Restlessness.  · Palpitations and irregular heartbeat.  · Anxiety and/or general nervousness.  Long-term problems:  · Convulsions.  · Heart attack.  · Poor skin color.  · A mental state that mimics serious psychiatric illness.  · Emotional instability.  · Aggression.  · Dry or itchy skin.  · Acne.  RISKS AND COMPLICATIONS  Risks associated with needle use and inhaling include:  · Infection.  · Vein damage.  · Overdose.  · Skin abscesses.  · Hepatitis.  · AIDS.  TREATMENT   There are 2 types:   · Short-term medical treatment. This helps to preserve life and prevent or minimize damage from the problems described above.  · Long-term substance abuse treatment. This helps to achieve recovery from drug abuse or addiction.  HOME CARE INSTRUCTIONS   After treatment discharge, it is essential to do the following:  · Follow all instructions from your caregiver very carefully.  · Take all medications as prescribed. If you cannot, contact your caregiver right away.  · Keep all appointments for further evaluation and counseling.  · Do not use drugs, alcohol or any other mind and mood altering drugs unless prescribed by your doctor.  · Do not operate a motor vehicle or machinery until your caregiver says it is OK.  SEEK IMMEDIATE  MEDICAL CARE IF:   · You have a seizure (convulsions).  · You become very shaky or tense.  · You become light headed or faint.  · You notice sudden or gradual weakness on one side of the body or an arm or leg, or are unable to walk.  · You have a sudden, severe headache, blurred vision or high fever.  · You develop chest pain, nausea or vomiting.  If you have any of the above symptoms, DO NOT DRIVE. Have someone else drive you or call your local emergency services (911 in [place] for help.  Document Released: 01/25/2006 Document Revised: 03/11/2013 Document Reviewed: 03/15/2011  ERCOM® Patient Information ©2014 [employer], [employer].

## 2018-10-23 ENCOUNTER — HOSPITAL ENCOUNTER (EMERGENCY)
Facility: MEDICAL CENTER | Age: 38
End: 2018-10-24
Attending: EMERGENCY MEDICINE

## 2018-10-23 DIAGNOSIS — R45.851 SUICIDAL IDEATION: ICD-10-CM

## 2018-10-23 DIAGNOSIS — F22 DELUSIONS (HCC): ICD-10-CM

## 2018-10-23 DIAGNOSIS — F15.10 AMPHETAMINE ABUSE (HCC): ICD-10-CM

## 2018-10-23 LAB
AMPHET UR QL SCN: POSITIVE
BARBITURATES UR QL SCN: NEGATIVE
BENZODIAZ UR QL SCN: NEGATIVE
BZE UR QL SCN: NEGATIVE
CANNABINOIDS UR QL SCN: NEGATIVE
METHADONE UR QL SCN: NEGATIVE
OPIATES UR QL SCN: NEGATIVE
OXYCODONE UR QL SCN: NEGATIVE
PCP UR QL SCN: NEGATIVE
POC BREATHALIZER: 0.09 PERCENT (ref 0–0.01)
PROPOXYPH UR QL SCN: NEGATIVE

## 2018-10-23 PROCEDURE — 700102 HCHG RX REV CODE 250 W/ 637 OVERRIDE(OP): Performed by: EMERGENCY MEDICINE

## 2018-10-23 PROCEDURE — 302970 POC BREATHALIZER

## 2018-10-23 PROCEDURE — A9270 NON-COVERED ITEM OR SERVICE: HCPCS | Performed by: EMERGENCY MEDICINE

## 2018-10-23 PROCEDURE — 80307 DRUG TEST PRSMV CHEM ANLYZR: CPT

## 2018-10-23 PROCEDURE — 99285 EMERGENCY DEPT VISIT HI MDM: CPT

## 2018-10-23 PROCEDURE — 302970 POC BREATHALIZER: Performed by: EMERGENCY MEDICINE

## 2018-10-23 RX ORDER — LORAZEPAM 1 MG/1
2 TABLET ORAL ONCE
Status: COMPLETED | OUTPATIENT
Start: 2018-10-23 | End: 2018-10-23

## 2018-10-23 RX ADMIN — LORAZEPAM 2 MG: 1 TABLET ORAL at 22:28

## 2018-10-24 VITALS
RESPIRATION RATE: 16 BRPM | TEMPERATURE: 97.9 F | OXYGEN SATURATION: 96 % | HEIGHT: 70 IN | BODY MASS INDEX: 23.62 KG/M2 | WEIGHT: 165 LBS | SYSTOLIC BLOOD PRESSURE: 120 MMHG | DIASTOLIC BLOOD PRESSURE: 85 MMHG | HEART RATE: 89 BPM

## 2018-10-24 PROCEDURE — 90791 PSYCH DIAGNOSTIC EVALUATION: CPT

## 2018-10-24 PROCEDURE — 99244 OFF/OP CNSLTJ NEW/EST MOD 40: CPT | Performed by: PSYCHIATRY & NEUROLOGY

## 2018-10-24 RX ORDER — LORAZEPAM 1 MG/1
0.5 TABLET ORAL EVERY 4 HOURS PRN
Status: DISCONTINUED | OUTPATIENT
Start: 2018-10-24 | End: 2018-10-24 | Stop reason: HOSPADM

## 2018-10-24 RX ORDER — RISPERIDONE 1 MG/1
1 TABLET ORAL DAILY
Status: DISCONTINUED | OUTPATIENT
Start: 2018-10-24 | End: 2018-10-24

## 2018-10-24 RX ORDER — LORAZEPAM 2 MG/ML
1 INJECTION INTRAMUSCULAR EVERY 4 HOURS PRN
Status: DISCONTINUED | OUTPATIENT
Start: 2018-10-24 | End: 2018-10-24 | Stop reason: HOSPADM

## 2018-10-24 RX ORDER — RISPERIDONE 1 MG/1
1 TABLET ORAL 2 TIMES DAILY
Status: DISCONTINUED | OUTPATIENT
Start: 2018-10-24 | End: 2018-10-24 | Stop reason: HOSPADM

## 2018-10-24 RX ORDER — RISPERIDONE 2 MG/1
2 TABLET ORAL EVERY EVENING
Status: DISCONTINUED | OUTPATIENT
Start: 2018-10-24 | End: 2018-10-24
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
## 2018-10-24 NOTE — ED NOTES
Patient's home medications have been reviewed by the pharmacy team.     Past Medical History:   Diagnosis Date   • Bipolar I disorder with depression (HCC)    • Schizo-affective schizophrenia, chronic condition (HCC)        Patient's Medications   New Prescriptions    No medications on file   Previous Medications    No medications on file   Modified Medications    No medications on file   Discontinued Medications    DIVALPROEX SODIUM (DEPAKOTE PO)    Take  by mouth.    RISPERIDONE (RISPERDAL) 2 MG TAB    Take 2 mg by mouth 2 times a day.          A:  Medications do not appear to be contributing to current complaints.     P:    No recommendations at this time.  Psychiatry has been consulted for recommendations.       Maria Esther Michael, BobD
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
## 2018-10-24 NOTE — DISCHARGE PLANNING
"Alert Team  Pt alert and oriented to self, time and place.  Pt answers \"sort of, I guess,\" regarding SI.  He denies HI and hallucinations.  He reports excessive appetite, \"I'm just so hungry.\"  "

## 2018-10-24 NOTE — CONSULTS
"[facility]   TRIAGE ASSESSMENT    Name: Matty Hernandez  MRN: [id number]  : 1980  Age: 38 y.o.  Date of assessment: 10/24/2018  PCP: Pcp Pt States None  Persons in attendance: Patient    CHIEF COMPLAINT/PRESENTING ISSUE (as stated by patient): Patient laying in bed. Unable to sit still. Movements are quick and jerking.  Eyes wide with hypervigilant stare.  Pressured speech.  Circumstantial. Reports being followed and feels a select group of people are following him.  Stated he feels suicidal and wants to \"kill\" himself before \"they\" get to him.  Patient unable to state the names of the people his wife sent after him.  Does know one by the nickname \"Cupcake.\"  Patient stated he would \"kill\" the persons who are after him by buying fentanyl patches and tricking them into overdosing on them.  Patient reports feeling safe in the hospital.  Denies feeling homicidal towards any of the staff at [facility].  Patient reports he is scared and will commit suicide if released from the hospital.  Affect congruent with the abovementioned statement.  Patient appears very frightened.  Assured patient he is safe and will not be leaving the hospital at this time.  Patient to remain on Legal hold for suicidal ideation at this time.    Chief Complaint   Patient presents with   • Suicidal   • Delusional        CURRENT LIVING SITUATION/SOCIAL SUPPORT: Patient is visiting from [facility].  His wife lives in [place] at this time.  Reports a strong family connection, but, feels his drug use is putting a strain on his family relationships.      BEHAVIORAL HEALTH TREATMENT HISTORY  Does patient/parent report a history of prior behavioral health treatment for patient?   Yes:    Dates Level of Care Facilty/Provider Diagnosis/Problem Medications   2016 inpatient [facility]    2014 inpatient [facility]                                                                  SAFETY ASSESSMENT - SELF  Does patient acknowledge current " "or past symptoms of dangerousness to self? yes  Does parent/significant other report patient has current or past symptoms of dangerousness to self? N\A  Does presenting problem suggest symptoms of dangerousness to self? Yes:     Past Current    Suicidal Thoughts: [x]  [x]    Suicidal Plans: [x]  [x]    Suicidal Intent: [x]  [x]    Suicide Attempts: [x]  []    Self-Injury []  [x]      For any boxes checked above, provide detail: Patient reports previous suicide attempt by overdosing on heroin.  Was taken to [facility] and treated for substance abuse.  Currently feels he is suicidal but does not have a specific plan.  \"I dunno.  I'd do whatever it takes to kill myself before they kill me.\"      History of suicide by family member: no  History of suicide by friend/significant other: no  Recent change in frequency/specificity/intensity of suicidal thoughts or self-harm behavior? yes - Increase in thoughts for the past two nights.  Patient reports using Methamphetamines and has not slept in the past 4-5 days.    Current access to firearms, medications, or other identified means of suicide/self-harm? no  If yes, willing to restrict access to means of suicide/self-harm? yes - Patient does not feel suicidal while in the hospital.  Verbally contracted for safety.    Protective factors present:  Fear of suicide, Strong family connections and Willing to address in treatment    SAFETY ASSESSMENT - OTHERS  Does patient acknowledge current or past symptoms of aggressive behavior or risk to others? Yes \"I am only feeling this way because they want to get me.  If they left me alone there would not be a problem.\"  Does parent/significant other report patient has current or past symptoms of aggressive behavior or risk to others?  no  Does presenting problem suggest symptoms of dangerousness to others? Yes:    History Current   Thoughts of injuring others? []  [x]    Threats to injure others? []  [x]    Plan to injure others? []  [x]  "   Intent to injure others? []  []    Has injured others? []  []    Thoughts of killing others? []  []    Threats to kill others? []  []    Plans to kill others? []  []    Intent to kill others? []  []    Has killed others? []  []    Perpetrator of sexual assault? []  []    Family history of homicide? []  []      For any boxes checked above, please provide detail: Patient is very paranoid and feels his wife has hired people to kill him.  Denies having thoughts of harming his wife.  Will only act in self defense.  Unable to provide the names of the people who are after him.      Recent change in frequency/specificity/intensity of thoughts or threats to harm others? no  Current access to firearms/other identified means of harm? no  If yes, willing to restrict access to weapons/means of harm? yes - patient feels safe in hospital  Protective factors present: Fear of consequences and Willing to address in treatment  Based on information provided during the current assessment, is a mandated “duty to warn” being exercised? No    Crisis Safety Plan completed and copy given to patient? no    ABUSE/NEGLECT SCREENING  Does patient report feeling “unsafe” in his/her home, or afraid of anyone?  no  Does patient report any history of physical, sexual, or emotional abuse?  no  Does parent or significant other report any of the above? N\A  Is there evidence of neglect by self?  no  Is there evidence of neglect by a caregiver? no  Does the patient/parent report any history of CPS/APS/police involvement related to suspected abuse/neglect or domestic violence? no  Based on the information provided during the current assessment, is a mandated report of suspected abuse/neglect being made?  No    SUBSTANCE USE SCREENING  Yes:  V[place]ile all substances used in the past 30 days:      Last Use Amount   []   Alcohol     []   Marijuana     []   Heroin     []   Prescription Opioids  (used without prescription, for    recreation, or in excess of  "prescribed amount)     []   Other Prescription  (used without prescription, for    recreation, or in excess of prescribed amount)     []   Cocaine      [x]   Methamphetamine 10/23/18    []   \"\" drugs (ectasy, MDMA)     []   Other substances        UDS results: Pending  Breathalyzer results: 0.0    What consequences does the patient associate with any of the above substance use and or addictive behaviors? None    Risk factors for detox (check all that apply):  []  Seizures   []  Diaphoretic (sweating)   []  Tremors   []  Hallucinations   []  Increased blood pressure   []  Decreased blood pressure   []  Other   []  None      [] Patient education on risk factors for detoxification and instructed to return to ER as needed.      MENTAL STATUS   Participation: Active verbal participation and Verbally monopolizing  Grooming: Neat  Orientation: Alert, Fully Oriented and Evidence of delusions present  Behavior: Tense and Hyperactive  Eye contact: Intense  Mood: Anxious and Manic  Affect: Full range  Thought process: Circumstantial  Thought content: Evidence of delusion  Speech: Pressured, Rapid and Hypertalkative  Memory:  No gross evidence of memory deficits  Insight: Poor  Judgment:  Poor  Other:    Collateral information:   Source:  [] Significant other present in person:   [] Significant other by telephone  [] [facility]   [] [facility] Nursing Staff  [x] [facility]  [] Other:     [x] Unable to complete full assessment due to:  [x] Acute intoxication  [] Patient declined to participate/engage  [] Patient verbally unresponsive  [] Significant cognitive deficits  [] Significant perceptual distortions or behavioral disorganization  [] Other:      CLINICAL IMPRESSIONS:  Primary:  Possible drug induced psychosis with underlying bipolar disorder  Secondary:         IDENTIFIED NEEDS/PLAN:  [Trigger DISPOSITION list for any items marked]    [x]  Imminent safety risk - self [] Imminent safety risk - others "   [x]  Acute substance withdrawal [x]  Psychosis/Impaired reality testing   []  Mood/anxiety []  Substance use/Addictive behavior   []  Maladaptive behaviro []  Parent/child conflict   []  Family/Couples conflict []  Biomedical   []  Housing []  Financial   []   Legal  Occupational/Educational   []  Domestic violence []  Other:     Disposition: Refer to Legal Hold    Does patient express agreement with the above plan? yes    Referral appointment(s) scheduled? no    Alert team only:   I have discussed findings and recommendations with Dr. Cruz who is in agreement with these recommendations.     Referral information sent to the following community providers :    If applicable : Referred  to : [facility] for legal hold follow up at (time): Pending ERP certification      Antolin Yousif R.N.  10/24/2018

## 2018-10-24 NOTE — ED NOTES
Patient stable w no ss of distress or agitation at this time. Sleeping soundly, no needs noted. Sitter remains outside of room for direct obs.

## 2018-10-24 NOTE — ED NOTES
Med Rec Updated and Complete per Pt at bedside  Allergies Reviewed  No PO ABX last 30 days    Pt denies any RX or OTC medication at this time

## 2018-10-24 NOTE — ED PROVIDER NOTES
ED PROVIDER NOTE    Scribed for Caleb Paul M.D. by Shai Santacruz. 10/24/2018, 10:53 AM.    This is an addendum to the note on 10/23/18. For further details and full chart entry, see the previously signed ED Provider Note written by Dr. Cruz (ERP).       MEDICAL DECISION MAKING:    10:53 AM - Patient's case was transferred into my care by Dr. Cruz. See previously signed note for further details.     Disposition:  Patient will be transferred to an appropriate psychiatric facility in stable condition for further psychiatric care and evaluation.  Patient will be placed under the care of my partner awaiting transfer.    FINAL IMPRESSION   1. Suicidal ideation    2. Amphetamine abuse (HCC)    3. Delusions (HCC)         I, Shai Santacruz (Scribe), am scribing for, and in the presence of, Caleb Paul M.D..    Electronically signed by: Shai Santacruz (Scribe), 10/24/2018    ICaleb M.D. personally performed the services described in this documentation, as scribed by Shai Santacruz in my presence, and it is both accurate and complete.    The note accurately reflects work and decisions made by me.  Caleb Paul  10/24/2018  11:15 AM

## 2018-10-24 NOTE — ED PROVIDER NOTES
"ED Provider Note    Scribed for Meg Cruz M.D. by Marlene Johnson. 10/23/2018, 10:08 PM.    Primary care provider: Pcp Pt States None  Means of arrival: ambulance  History obtained from: Patient  History limited by: none    CHIEF COMPLAINT  Chief Complaint   Patient presents with   • Suicidal   • Delusional       HPI  Matty Hernandez is a 38 y.o. male with a history of methamphetamine abuse who presents to the Emergency Department with suicidal and homicidal ideations. He states that he wants to kill someone named natasha - though when asked again cannot tell me who that person is. Patient also reports having a plan to hurt himself either with a heroin OD or hanging himself. Patient reports that his wife is conspiring to kill him. He states that his wife is involved with other people in a conspiracy to kill him. He reports that these people have physically tried to hurt him. When he asked how they hurt him he stated \"that is a good question.\" Patient states that he is not sure how he hurt himself. Additionally, patient reports smoking amphetamines yesterday. He states that he has no one else in town beside his wife. He also reports that his last tetanus shot was recent but he is not completely sure.     REVIEW OF SYSTEMS  Positives as above. Pertinent negatives include no fever, chills, or vomiting.     PAST MEDICAL HISTORY   has a past medical history of Bipolar I disorder with depression (HCC) and Schizo-affective schizophrenia, chronic condition (HCC).    SURGICAL HISTORY  patient denies any surgical history    SOCIAL HISTORY  Social History   Substance Use Topics   • Smoking status: Current Some Day Smoker   • Smokeless tobacco: Never Used   • Alcohol use Yes      Comment: occ      History   Drug Use   • Types: Intravenous, Inhaled     Comment: crystal meth, smokes meth daily       FAMILY HISTORY  None noted    CURRENT MEDICATIONS  Reviewed. See Encounter Summary.     ALLERGIES  No Known " "Allergies    PHYSICAL EXAM  VITAL SIGNS: /89   Pulse 98   Temp 36.6 °C (97.9 °F)   Resp 16   Ht 1.778 m (5' 10\")   Wt 74.8 kg (165 lb)   SpO2 96%   BMI 23.68 kg/m²    Pulse ox interpretation: I interpret this pulse ox as normal.  Constitutional: Alert in no apparent distress.  HENT: Normocephalic atraumatic, MMM  Eyes: PERR, Conjunctiva normal, Non-icteric.   Neck: Normal range of motion, No tenderness, Supple, No stridor.   Cardiovascular: Regular rate and rhythm, no murmurs.   Thorax & Lungs: Normal breath sounds, No respiratory distress, No wheezing, No chest tenderness.   Abdomen: Bowel sounds normal, Soft, No tenderness, No pulsatile masses. No peritoneal signs.  Skin: Warm, Dry. 6 superificial lacerations to left forearm on dorsal and palmar aspect. No active bleeding, no fat or subcutaneous tissue exposed on any of them.  Extremities: Intact distal pulses, No edema, No tenderness, No cyanosis  Neurologic: Alert and oriented, No focal deficits noted.   Psychiatric:  paranoid delusions, suicidal ideations, ? Homicidal ideations, agitation     DIFFERENTIAL DIAGNOSIS AND WORK UP PLAN    10:08 PM Patient seen and examined at bedside. The patient presents with delusions and suicidal ideations and the differential diagnosis includes but is not limited to amphetamine induced delusional behavior, suicidal and homicidal behavior. Patient placed on legal hold by PD today. Ordered for urine drug screen and POC breathalyzer to evaluate. Patient will be treated with Ativan tablet 2 mg for his symptoms. I informed the patient of plan of care to give Ativan and he was agreeable at this time.    DIAGNOSTIC STUDIES / PROCEDURES     LABS  Labs Reviewed   URINE DRUG SCREEN - Abnormal; Notable for the following:        Result Value    Amphetamines Urine Positive (*)     All other components within normal limits   POC BREATHALIZER - Abnormal; Notable for the following:     POC Breathalizer 0.09 (*)     All other " components within normal limits     All labs were reviewed by me.    COURSE & MEDICAL DECISION MAKING  Pertinent Labs & Imaging studies reviewed. (See chart for details)    Patient was evaluated by life skills RN as well as myself, he has a history of amphetamine induced delusions paranoid behavior and suicidal ideation.  When I went to reassess the patient he denies any homicidal ideation and he is calm after some oral Ativan.  Patient will be kept on a legal hold for suicidal ideations and his continued delusions that his wife is out there trying to kill him and hiring other people to do so.  He has been more calm and more cooperative since the Ativan I believe a lot of this is secondary to the amphetamine induced delusions.    Patient was cleared on his legal hold to be evaluated by psychiatry     FINAL IMPRESSION  1. Suicidal ideation    2. Amphetamine abuse (HCC)    3. Delusions (HCC)          Marlene RAMSEY (Scribe), am scribing for, and in the presence of, Meg Cruz M.D..    Electronically signed by: Marlene Johnson (Scribe), 10/23/2018    IMeg M.D. personally performed the services described in this documentation, as scribed by Marlene Johnson in my presence, and it is both accurate and complete. E.    The note accurately reflects work and decisions made by me.  Meg Cruz  10/24/2018  5:14 AM    This dictation has been created using voice recognition software and/or scribes. The accuracy of the dictation is limited by the abilities of the software and the expertise of the scribes. I expect there may be some errors of grammar and possibly content. I made every attempt to manually correct the errors within my dictation. However, errors related to voice recognition software and/or scribes may still exist and should be interpreted within the appropriate context.

## 2018-10-24 NOTE — DISCHARGE PLANNING
Medical Social Work    Referral: Legal Hold    Intervention: Legal Hold Paperwork given to SW by Life Skills RN: Antolin    Legal Hold Initiated: Date: 10/23/2018  Time: 2130    Legal Hold faxed: Date: 10/24/2018  Time: 0706    Patient’s Insurance Listed on Face Sheet: [place] [employer]    Referrals sent to: [facility], [facility] and [facility]    Plan: Patient will transfer to mental health facility once acceptance is obtained.

## 2018-10-25 NOTE — DISCHARGE PLANNING
Pt has been accepted to [facility] by Dr. Wallace.  Pina called [facility] and set up transport through [facility] for 1800.  Pina updated [facility], bedside rn, and completed transfer packet.

## 2020-01-04 NOTE — DISCHARGE PLANNING
Notice of transfer filed with the court via [employer], scanned copy of verified notice onto .     No
